# Patient Record
Sex: FEMALE | Race: BLACK OR AFRICAN AMERICAN | Employment: OTHER | URBAN - METROPOLITAN AREA
[De-identification: names, ages, dates, MRNs, and addresses within clinical notes are randomized per-mention and may not be internally consistent; named-entity substitution may affect disease eponyms.]

---

## 2014-01-01 LAB — PAP SMEAR, EXTERNAL: NORMAL

## 2017-02-15 ENCOUNTER — OP HISTORICAL/CONVERTED ENCOUNTER (OUTPATIENT)
Dept: OTHER | Age: 67
End: 2017-02-15

## 2017-04-18 ENCOUNTER — OP HISTORICAL/CONVERTED ENCOUNTER (OUTPATIENT)
Dept: OTHER | Age: 67
End: 2017-04-18

## 2017-08-04 ENCOUNTER — OP HISTORICAL/CONVERTED ENCOUNTER (OUTPATIENT)
Dept: OTHER | Age: 67
End: 2017-08-04

## 2017-08-31 ENCOUNTER — OP HISTORICAL/CONVERTED ENCOUNTER (OUTPATIENT)
Dept: OTHER | Age: 67
End: 2017-08-31

## 2018-02-13 ENCOUNTER — OP HISTORICAL/CONVERTED ENCOUNTER (OUTPATIENT)
Dept: OTHER | Age: 68
End: 2018-02-13

## 2018-05-01 ENCOUNTER — OP HISTORICAL/CONVERTED ENCOUNTER (OUTPATIENT)
Dept: OTHER | Age: 68
End: 2018-05-01

## 2018-05-17 ENCOUNTER — OP HISTORICAL/CONVERTED ENCOUNTER (OUTPATIENT)
Dept: OTHER | Age: 68
End: 2018-05-17

## 2018-06-26 ENCOUNTER — OP HISTORICAL/CONVERTED ENCOUNTER (OUTPATIENT)
Dept: OTHER | Age: 68
End: 2018-06-26

## 2018-10-04 ENCOUNTER — OP HISTORICAL/CONVERTED ENCOUNTER (OUTPATIENT)
Dept: OTHER | Age: 68
End: 2018-10-04

## 2018-12-14 ENCOUNTER — OP HISTORICAL/CONVERTED ENCOUNTER (OUTPATIENT)
Dept: OTHER | Age: 68
End: 2018-12-14

## 2019-03-26 ENCOUNTER — OP HISTORICAL/CONVERTED ENCOUNTER (OUTPATIENT)
Dept: OTHER | Age: 69
End: 2019-03-26

## 2020-01-27 LAB
A/G RATIO, EXTERNAL: 1.3
ALBUMIN, EXTERNAL: 4.1
ALK PHOS, EXTERNAL: 85
ANION GAP BLD CALC-SCNC: NORMAL MMOL/L
BILI DIRECT, EXTERNAL: NORMAL
BILI TOTAL, EXTERNAL: 0.3
BUN BLD-MCNC: 24 MG/DL
BUN/CREATININE RATIO, EXTERNAL: 24
CALCIUM, EXTERNAL: 11.8
CALCIUM, ION, EXTERNAL: NORMAL
CHLORIDE, EXTERNAL: 98
CO2, EXTERNAL: NORMAL
CREATININE SER, EXTERNAL: 0.91
CREATININE, EXTERNAL: 0.91
EGFR IF AFA, EXTERNAL: 74
EGFR NOT AFA, EXTERNAL: 65
GLOBULIN, EXTERNAL: 3.1
GLUCOSE SER, EXTERNAL: 69
HBA1C MFR BLD HPLC: 5.8 %
LDL-C, EXTERNAL: 116
POTASSIUM, EXTERNAL: 4.4
PROTEIN TOT, EXTERNAL: 7.2
SGOT (AST), EXTERNAL: 16
SGPT (ALT), EXTERNAL: NORMAL
SODIUM, EXTERNAL: 140

## 2020-05-08 LAB — HBA1C MFR BLD HPLC: NORMAL %

## 2020-06-25 VITALS
BODY MASS INDEX: 53.6 KG/M2 | SYSTOLIC BLOOD PRESSURE: 140 MMHG | OXYGEN SATURATION: 94 % | HEART RATE: 83 BPM | RESPIRATION RATE: 18 BRPM | HEIGHT: 60 IN | DIASTOLIC BLOOD PRESSURE: 88 MMHG | TEMPERATURE: 97.9 F | WEIGHT: 273 LBS

## 2020-06-25 PROBLEM — E78.5 HYPERLIPIDEMIA: Status: ACTIVE | Noted: 2020-06-25

## 2020-06-25 PROBLEM — E87.70 LOCALIZED EDEMA DUE TO FLUID OVERLOAD: Status: ACTIVE | Noted: 2020-06-25

## 2020-06-25 PROBLEM — M10.9 GOUT: Status: ACTIVE | Noted: 2020-06-25

## 2020-06-25 PROBLEM — I10 ESSENTIAL HYPERTENSION: Status: ACTIVE | Noted: 2020-06-25

## 2020-06-25 PROBLEM — M25.569 KNEE PAIN: Status: ACTIVE | Noted: 2020-06-25

## 2020-06-25 RX ORDER — ALLOPURINOL 100 MG/1
TABLET ORAL DAILY
COMMUNITY
End: 2021-05-15

## 2020-06-25 RX ORDER — FUROSEMIDE 20 MG/1
TABLET ORAL DAILY
COMMUNITY
End: 2021-04-09 | Stop reason: SDUPTHER

## 2020-06-25 RX ORDER — VERAPAMIL HYDROCHLORIDE 240 MG/1
240 CAPSULE, EXTENDED RELEASE ORAL DAILY
COMMUNITY
End: 2020-08-10

## 2020-06-25 RX ORDER — TRAMADOL HYDROCHLORIDE 50 MG/1
50 TABLET ORAL
COMMUNITY
End: 2020-08-14 | Stop reason: SDUPTHER

## 2020-06-25 RX ORDER — COLCHICINE 0.6 MG/1
0.6 TABLET ORAL DAILY
COMMUNITY
End: 2020-08-14 | Stop reason: SDUPTHER

## 2020-06-25 RX ORDER — INDOMETHACIN 50 MG/1
CAPSULE ORAL 3 TIMES DAILY
COMMUNITY
End: 2021-01-05 | Stop reason: ALTCHOICE

## 2020-06-25 RX ORDER — VALSARTAN AND HYDROCHLOROTHIAZIDE 320; 25 MG/1; MG/1
1 TABLET, FILM COATED ORAL DAILY
COMMUNITY
End: 2021-04-23

## 2020-07-30 LAB
ALBUMIN SERPL-MCNC: 3.8 G/DL (ref 3.8–4.8)
ALBUMIN/GLOB SERPL: 1.3 {RATIO} (ref 1.2–2.2)
ALP SERPL-CCNC: 65 IU/L (ref 39–117)
ALT SERPL-CCNC: 11 IU/L (ref 0–32)
AST SERPL-CCNC: 15 IU/L (ref 0–40)
BILIRUB SERPL-MCNC: 0.6 MG/DL (ref 0–1.2)
BUN SERPL-MCNC: 15 MG/DL (ref 8–27)
BUN/CREAT SERPL: 19 (ref 12–28)
CALCIUM SERPL-MCNC: 11.5 MG/DL (ref 8.7–10.3)
CHLORIDE SERPL-SCNC: 100 MMOL/L (ref 96–106)
CHOLEST SERPL-MCNC: 196 MG/DL (ref 100–199)
CO2 SERPL-SCNC: 26 MMOL/L (ref 20–29)
CREAT SERPL-MCNC: 0.79 MG/DL (ref 0.57–1)
ERYTHROCYTE [DISTWIDTH] IN BLOOD BY AUTOMATED COUNT: 14.3 % (ref 11.7–15.4)
GLOBULIN SER CALC-MCNC: 2.9 G/DL (ref 1.5–4.5)
GLUCOSE SERPL-MCNC: 88 MG/DL (ref 65–99)
HCT VFR BLD AUTO: 37.3 % (ref 34–46.6)
HDLC SERPL-MCNC: 62 MG/DL
HGB BLD-MCNC: 12.1 G/DL (ref 11.1–15.9)
LDLC SERPL CALC-MCNC: 120 MG/DL (ref 0–99)
MCH RBC QN AUTO: 28.3 PG (ref 26.6–33)
MCHC RBC AUTO-ENTMCNC: 32.4 G/DL (ref 31.5–35.7)
MCV RBC AUTO: 87 FL (ref 79–97)
PLATELET # BLD AUTO: 236 X10E3/UL (ref 150–450)
POTASSIUM SERPL-SCNC: 4.1 MMOL/L (ref 3.5–5.2)
PROT SERPL-MCNC: 6.7 G/DL (ref 6–8.5)
RBC # BLD AUTO: 4.27 X10E6/UL (ref 3.77–5.28)
SODIUM SERPL-SCNC: 141 MMOL/L (ref 134–144)
TRIGL SERPL-MCNC: 72 MG/DL (ref 0–149)
VLDLC SERPL CALC-MCNC: 14 MG/DL (ref 5–40)
WBC # BLD AUTO: 5.2 X10E3/UL (ref 3.4–10.8)

## 2020-07-31 NOTE — PROGRESS NOTES
LDL ( bad cholesterol) elevated, consider dietary modifications such as more fiber, less fried food, less red meats, no med changes, will re-check at f/u

## 2020-08-03 VITALS
RESPIRATION RATE: 18 BRPM | SYSTOLIC BLOOD PRESSURE: 140 MMHG | DIASTOLIC BLOOD PRESSURE: 80 MMHG | HEART RATE: 80 BPM | OXYGEN SATURATION: 94 % | WEIGHT: 273 LBS | HEIGHT: 60 IN | BODY MASS INDEX: 53.6 KG/M2 | TEMPERATURE: 98 F

## 2020-08-06 NOTE — PROGRESS NOTES
08/06/2020 @ 10:36 AM called and machine answered phone. NO voicemail left; message asked to please call back.

## 2020-08-10 RX ORDER — VERAPAMIL HYDROCHLORIDE 240 MG/1
CAPSULE, EXTENDED RELEASE ORAL
Qty: 90 CAP | Refills: 1 | Status: SHIPPED | OUTPATIENT
Start: 2020-08-10 | End: 2021-04-30 | Stop reason: SDUPTHER

## 2020-08-14 ENCOUNTER — OFFICE VISIT (OUTPATIENT)
Dept: FAMILY MEDICINE CLINIC | Age: 70
End: 2020-08-14
Payer: MEDICARE

## 2020-08-14 VITALS
HEIGHT: 65 IN | DIASTOLIC BLOOD PRESSURE: 84 MMHG | HEART RATE: 86 BPM | RESPIRATION RATE: 18 BRPM | SYSTOLIC BLOOD PRESSURE: 168 MMHG | BODY MASS INDEX: 46.15 KG/M2 | WEIGHT: 277 LBS | OXYGEN SATURATION: 98 %

## 2020-08-14 DIAGNOSIS — M54.9 CHRONIC NECK AND BACK PAIN: ICD-10-CM

## 2020-08-14 DIAGNOSIS — L81.9 DISCOLORATION OF SKIN OF LOWER LEG: ICD-10-CM

## 2020-08-14 DIAGNOSIS — E66.01 OBESITY, MORBID (HCC): ICD-10-CM

## 2020-08-14 DIAGNOSIS — M1A.9XX0 CHRONIC GOUT WITHOUT TOPHUS, UNSPECIFIED CAUSE, UNSPECIFIED SITE: Primary | ICD-10-CM

## 2020-08-14 DIAGNOSIS — M54.2 CHRONIC NECK AND BACK PAIN: ICD-10-CM

## 2020-08-14 DIAGNOSIS — G89.29 CHRONIC NECK AND BACK PAIN: ICD-10-CM

## 2020-08-14 PROCEDURE — 99214 OFFICE O/P EST MOD 30 MIN: CPT | Performed by: NURSE PRACTITIONER

## 2020-08-14 RX ORDER — ASPIRIN 81 MG/1
TABLET ORAL
COMMUNITY

## 2020-08-14 RX ORDER — COLCHICINE 0.6 MG/1
TABLET ORAL
Qty: 14 TAB | Refills: 0 | Status: SHIPPED | OUTPATIENT
Start: 2020-08-14 | End: 2022-07-13

## 2020-08-14 RX ORDER — TRAMADOL HYDROCHLORIDE 50 MG/1
50 TABLET ORAL
Qty: 120 TAB | Refills: 0 | Status: SHIPPED | OUTPATIENT
Start: 2020-08-14 | End: 2020-09-13

## 2020-08-14 NOTE — PROGRESS NOTES
Subjective  Anjelica Finley is a 79 y.o. female. HPI : Usual provider is Dr. Jennifer Calles. Patient is new to me. Has complaints today of pain in knees, ankles and shoulders. This is chronic pain- [facility] but it has been suspended due to the COVID-19 crisis at this time. She is also concerned about discoloration along the sides of both feet. No pain numbness burning tingling. Skin is intact. She has history of edema in legs and feet which is currently stable for her. She takes daily furosemide 20 mg which is helpful for the edema. Review of Systems   Constitutional: Positive for fever. Negative for chills and malaise/fatigue. HENT: Negative for sore throat. Eyes: Negative for blurred vision. Respiratory: Negative for cough, shortness of breath and wheezing. Cardiovascular: Positive for leg swelling. Negative for chest pain and palpitations. Gastrointestinal: Negative for abdominal pain, constipation, diarrhea, heartburn, nausea and vomiting. Genitourinary: Negative for dysuria. Musculoskeletal: Positive for joint pain. Skin: Negative for itching and rash. Neurological: Negative for dizziness, tingling, sensory change and headaches. Psychiatric/Behavioral: Negative for depression. The patient is not nervous/anxious and does not have insomnia. Objective  Physical Exam  Constitutional:       Comments: Morbidly obese   HENT:      Head: Normocephalic. Right Ear: External ear normal.      Left Ear: External ear normal.      Nose: Nose normal.   Eyes:      Conjunctiva/sclera: Conjunctivae normal.   Neck:      Musculoskeletal: Neck supple. Cardiovascular:      Rate and Rhythm: Normal rate and regular rhythm. Pulses: Normal pulses. Heart sounds: Normal heart sounds. No murmur. Pulmonary:      Effort: Pulmonary effort is normal.      Breath sounds: Normal breath sounds.    Musculoskeletal:         General: Swelling present. No tenderness. Right lower leg: Edema present. Left lower leg: Edema present. Comments: Mild edema in feet bilaterally. Skin:     General: Skin is warm and dry. Comments: Skin Is darkened at the lateral aspect on both feet. Neurological:      Mental Status: She is alert and oriented to person, place, and time. Psychiatric:         Mood and Affect: Mood normal.         Behavior: Behavior normal.     Assessment & Plan    ICD-10-CM ICD-9-CM    1. Chronic gout without tophus, unspecified cause, unspecified site  M1A. 9XX0 274.02 colchicine 0.6 mg tablet   2. Discoloration of skin of lower leg  L81.9 709.00 REFERRAL TO VASCULAR SURGERY   3. Obesity, morbid ([facility] [address]  E66.01 278.01    4.  Chronic neck and back pain  M54.2 723.1 traMADoL (ULTRAM) 50 mg tablet    M54.9 724.5     G89.29 338.29      Delonte Daily NP

## 2020-08-22 PROBLEM — M54.40 LUMBAGO WITH SCIATICA: Status: ACTIVE | Noted: 2018-05-01

## 2020-08-22 PROBLEM — R60.9 DEPENDENT EDEMA: Status: ACTIVE | Noted: 2020-08-22

## 2020-08-22 PROBLEM — M79.9 DISORDER OF SOFT TISSUE: Status: ACTIVE | Noted: 2018-05-01

## 2020-08-22 PROBLEM — E78.5 DYSLIPIDEMIA: Status: ACTIVE | Noted: 2017-03-09

## 2020-08-22 PROBLEM — M43.10 SPONDYLOLISTHESIS: Status: ACTIVE | Noted: 2018-09-21

## 2020-08-22 PROBLEM — M1A.00X0 CHRONIC GOUTY ARTHRITIS: Status: ACTIVE | Noted: 2020-08-22

## 2020-08-22 PROBLEM — E83.52 HYPERCALCEMIA: Status: ACTIVE | Noted: 2020-08-22

## 2020-08-22 PROBLEM — M51.26 DISPLACEMENT OF LUMBAR INTERVERTEBRAL DISC WITHOUT MYELOPATHY: Status: ACTIVE | Noted: 2018-09-21

## 2020-08-22 PROBLEM — H54.7 VISUAL IMPAIRMENT: Status: ACTIVE | Noted: 2020-08-22

## 2020-08-22 PROBLEM — G58.8 INTERCOSTAL NEURALGIA: Status: ACTIVE | Noted: 2018-08-07

## 2020-08-22 PROBLEM — R73.02 IMPAIRED GLUCOSE TOLERANCE: Status: ACTIVE | Noted: 2020-08-22

## 2020-08-22 PROBLEM — M79.601 PAIN IN RIGHT ARM: Status: ACTIVE | Noted: 2018-05-01

## 2020-10-19 ENCOUNTER — TELEPHONE (OUTPATIENT)
Dept: FAMILY MEDICINE CLINIC | Age: 70
End: 2020-10-19

## 2020-10-19 DIAGNOSIS — M54.2 CHRONIC NECK AND BACK PAIN: Primary | ICD-10-CM

## 2020-10-19 DIAGNOSIS — M54.9 CHRONIC NECK AND BACK PAIN: Primary | ICD-10-CM

## 2020-10-19 DIAGNOSIS — G89.29 CHRONIC NECK AND BACK PAIN: Primary | ICD-10-CM

## 2020-10-20 RX ORDER — TRAMADOL HYDROCHLORIDE 50 MG/1
50 TABLET ORAL
Qty: 90 TAB | Refills: 0 | Status: CANCELLED | OUTPATIENT
Start: 2020-10-20 | End: 2020-11-19

## 2020-10-20 NOTE — TELEPHONE ENCOUNTER
Patient called checking on the status of her Tramadol refill. She stated she has not had a script for a little while but is hurting pretty bad and needs a refill.

## 2020-10-21 NOTE — TELEPHONE ENCOUNTER
Patient called checking on the status of her Tramadol refill. She stated she has not had a script for a little while but is hurting pretty bad and needs a refill. Dahlia left another message today stating she is still in pain and the pharmacy still does not have her medication. She is requesting her medication be sent in please.

## 2020-10-22 DIAGNOSIS — M54.9 CHRONIC NECK AND BACK PAIN: Primary | ICD-10-CM

## 2020-10-22 DIAGNOSIS — M54.2 CHRONIC NECK AND BACK PAIN: Primary | ICD-10-CM

## 2020-10-22 DIAGNOSIS — G89.29 CHRONIC NECK AND BACK PAIN: Primary | ICD-10-CM

## 2020-10-22 RX ORDER — TRAMADOL HYDROCHLORIDE 50 MG/1
50 TABLET ORAL
Qty: 12 TAB | Refills: 5 | Status: SHIPPED | OUTPATIENT
Start: 2020-10-22 | End: 2020-10-23 | Stop reason: SDUPTHER

## 2020-10-23 ENCOUNTER — TELEPHONE (OUTPATIENT)
Dept: FAMILY MEDICINE CLINIC | Age: 70
End: 2020-10-23

## 2020-10-23 DIAGNOSIS — M54.9 CHRONIC NECK AND BACK PAIN: ICD-10-CM

## 2020-10-23 DIAGNOSIS — G89.29 CHRONIC NECK AND BACK PAIN: ICD-10-CM

## 2020-10-23 DIAGNOSIS — M54.2 CHRONIC NECK AND BACK PAIN: ICD-10-CM

## 2020-10-23 RX ORDER — TRAMADOL HYDROCHLORIDE 50 MG/1
50 TABLET ORAL
Qty: 120 TAB | Refills: 5 | Status: SHIPPED | OUTPATIENT
Start: 2020-10-23 | End: 2020-10-26

## 2020-10-23 NOTE — PROGRESS NOTES
I have reviewed the note dated 8- and agree that I was available for discussion in person, telephonically or virtually. I will follow along with CLAIRE Dela Cruz.     Juan Class, DO

## 2020-10-23 NOTE — TELEPHONE ENCOUNTER
Patient called and she only got 12 pills of the Tramadol. She needs a script to get her through the month. She is requessting a phone call from the provider. She needs something else because 12 pills will not get her through the month. Please call patient on her cell phone.

## 2020-10-26 NOTE — TELEPHONE ENCOUNTER
Patient called back and is in a lot of pain. She normally gets 120 tabs but was only given 12 for the month. Please advise and call patient when resolved.

## 2020-10-28 DIAGNOSIS — M54.9 BACK PAIN, UNSPECIFIED BACK LOCATION, UNSPECIFIED BACK PAIN LATERALITY, UNSPECIFIED CHRONICITY: Primary | ICD-10-CM

## 2020-10-28 RX ORDER — TRAMADOL HYDROCHLORIDE 50 MG/1
50 TABLET ORAL
Qty: 120 TAB | Refills: 5 | Status: SHIPPED | OUTPATIENT
Start: 2020-10-28 | End: 2020-11-27

## 2020-12-15 ENCOUNTER — TELEPHONE (OUTPATIENT)
Dept: FAMILY MEDICINE CLINIC | Age: 70
End: 2020-12-15

## 2020-12-15 ENCOUNTER — OFFICE VISIT (OUTPATIENT)
Dept: FAMILY MEDICINE CLINIC | Age: 70
End: 2020-12-15
Payer: MEDICARE

## 2020-12-15 VITALS
DIASTOLIC BLOOD PRESSURE: 84 MMHG | HEART RATE: 77 BPM | WEIGHT: 282 LBS | RESPIRATION RATE: 18 BRPM | BODY MASS INDEX: 46.93 KG/M2 | SYSTOLIC BLOOD PRESSURE: 146 MMHG | TEMPERATURE: 98.1 F | OXYGEN SATURATION: 96 %

## 2020-12-15 DIAGNOSIS — R60.0 LOCALIZED EDEMA: ICD-10-CM

## 2020-12-15 DIAGNOSIS — M54.41 CHRONIC BILATERAL LOW BACK PAIN WITH BILATERAL SCIATICA: ICD-10-CM

## 2020-12-15 DIAGNOSIS — M79.605 LEFT LEG PAIN: Primary | ICD-10-CM

## 2020-12-15 DIAGNOSIS — R73.02 IMPAIRED GLUCOSE TOLERANCE: ICD-10-CM

## 2020-12-15 DIAGNOSIS — M54.42 CHRONIC BILATERAL LOW BACK PAIN WITH BILATERAL SCIATICA: ICD-10-CM

## 2020-12-15 DIAGNOSIS — G89.29 CHRONIC BILATERAL LOW BACK PAIN WITH BILATERAL SCIATICA: ICD-10-CM

## 2020-12-15 DIAGNOSIS — I10 HYPERTENSION, ESSENTIAL: ICD-10-CM

## 2020-12-15 DIAGNOSIS — E78.5 HYPERLIPIDEMIA, UNSPECIFIED HYPERLIPIDEMIA TYPE: ICD-10-CM

## 2020-12-15 PROCEDURE — 99214 OFFICE O/P EST MOD 30 MIN: CPT | Performed by: NURSE PRACTITIONER

## 2020-12-15 NOTE — TELEPHONE ENCOUNTER
Via Yasemin Whyte from Dr. John Paul Owens office would like the last progress notes from our office faxed over to [phone]. Patient was send in office today 12/15 so after the notes are done, can you please fax notes over to Dr. John Paul Owens.

## 2020-12-18 NOTE — PROGRESS NOTES
Subjective:     Lona Rosales is a 79 y.o. female who presents for follow up of hypertension, hyperlipidemia, coronary artery disease, obesity and swelling of the legs. Diet and Lifestyle: generally follows a low fat low cholesterol diet, not attempting to follow a low sodium diet, exercises regularly, nonsmoker  Home BP Monitoring: is not measured at home    Cardiovascular ROS: taking medications as instructed, no medication side effects noted, no TIA's, no chest pain on exertion, no dyspnea on exertion, noting swelling of ankles. New concerns: New onset of leg swelling more prominent in on the left ankle and feet. Does not experience any weakness or tingling per se but does increase swelling with prolonged standing. Has tried compression stockings with minimal improvement. .     Patient Active Problem List   Diagnosis Code    Localized edema due to fluid overload E87.70    Essential hypertension I10    Gout M10.9    Hyperlipidemia E78.5    Knee pain M25.569    Chronic neck and back pain M54.2, M54.9, G89.29    Morbid obesity ([facility] E66.01    Chronic gouty arthritis M1A. 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    Impaired glucose tolerance R73.02    Lumbago with sciatica M54.40    Pain in right arm M79.601    Spondylolisthesis M43.10    Visual impairment H54.7     Patient Active Problem List    Diagnosis Date Noted    Chronic gouty arthritis 08/22/2020    Dependent edema 08/22/2020    Hypercalcemia 08/22/2020    Impaired glucose tolerance 08/22/2020    Visual impairment 08/22/2020    Morbid obesity ([facility] [address] 08/14/2020    Localized edema due to fluid overload 06/25/2020    Essential hypertension 06/25/2020    Gout 06/25/2020    Hyperlipidemia 06/25/2020    Knee pain 06/25/2020    Chronic neck and back pain     Displacement of lumbar intervertebral disc without myelopathy 2018    Spondylolisthesis 2018    Intercostal neuralgia 2018    Disorder of soft tissue 2018    Lumbago with sciatica 2018    Pain in right arm 2018    Dyslipidemia 2017     Current Outpatient Medications   Medication Sig Dispense Refill    colchicine 0.6 mg tablet Take 2 tabs by mouth x 1 dose and can repeat 1 tablet 1 hour later for gout flare. Then take 1 tab daily thereafter until pain gone. Do not take allopurinol while using the colchicine. 14 Tab 0    aspirin delayed-release 81 mg tablet aspirin 81 mg tablet,delayed release   TAKE 1 TABLET BY MOUTH DAILY WITH A MEAL      verapamil ER (VERELAN) 240 mg ER capsule TAKE 1 CAPSULE BY MOUTH EVERY DAY 90 Cap 1    allopurinoL (ZYLOPRIM) 100 mg tablet Take  by mouth daily.  furosemide (LASIX) 20 mg tablet Take  by mouth daily.  indomethacin (INDOCIN) 50 mg capsule Take  by mouth three (3) times daily.  valsartan-hydroCHLOROthiazide (DIOVAN-HCT) 320-25 mg per tablet Take 1 Tab by mouth daily.        Allergies   Allergen Reactions    Gabapentin Other (comments)     Past Medical History:   Diagnosis Date    Chronic neck and back pain     Essential hypertension 2020    Gout 2020    Hyperlipidemia 2020    Knee pain 2020    Localized edema due to fluid overload 2020     Past Surgical History:   Procedure Laterality Date    HX GYN          HX GYN           HX ORTHOPAEDIC Left     complete rotator cuff repair x 2     HX ORTHOPAEDIC Right     total knee replacement     Family History   Problem Relation Age of Onset    No Known Problems Mother     No Known Problems Father      Social History     Tobacco Use    Smoking status: Never Smoker    Smokeless tobacco: Never Used   Substance Use Topics    Alcohol use: Not Currently     Frequency: Never        Lab Results   Component Value Date/Time WBC 5.2 07/28/2020 12:16 PM    HGB 12.1 07/28/2020 12:16 PM    HCT 37.3 07/28/2020 12:16 PM    PLATELET 032 94/30/6842 12:16 PM    MCV 87 07/28/2020 12:16 PM     Lab Results   Component Value Date/Time    Hemoglobin A1c, External 1,272,020 05/08/2020    Hemoglobin A1c, External 5.8 01/27/2020    Glucose 88 07/28/2020 12:16 PM    LDL, calculated 120 (H) 07/28/2020 12:16 PM    Creatinine 0.79 07/28/2020 12:16 PM      Lab Results   Component Value Date/Time    Cholesterol, total 196 07/28/2020 12:16 PM    HDL Cholesterol 62 07/28/2020 12:16 PM    LDL, calculated 120 (H) 07/28/2020 12:16 PM    LDL-C, External 116 01/27/2020    Triglyceride 72 07/28/2020 12:16 PM     No results found for: TSH, TSH2, TSH3, TSHP, TSHELE, TSHEXT, TT3, T3U, T3UP, FRT3, FT3, FT4, FT4P, T4, T4P, FT4T, TT7, TSHEXT      Review of Systems, additional:  A comprehensive review of systems was negative except for that written in the HPI. Objective:     Visit Vitals  BP (!) 146/84 (BP 1 Location: Right arm, BP Patient Position: Sitting)   Pulse 77   Temp 98.1 °F (36.7 °C) (Oral)   Resp 18   Wt 282 lb (127.9 kg)   SpO2 96%   BMI 46.93 kg/m²     Appearance: alert, well appearing, and in no distress, oriented to person, place, and time, overweight and acyanotic, in no respiratory distress.   General exam: CVS exam BP noted to be well controlled today in office, S1, S2 normal, no gallop, no murmur, chest clear, no JVD, no HSM, no edema, diabetic exam heart sounds normal rate, regular rhythm, normal S1, S2, no murmurs, rubs, clicks or gallops, chest clear, no hepatosplenomegaly, no carotid bruits, peripheral vascular exam varicose veins noted, color, temperature, sensation in feet normal, no lesions, neurological exam alert, oriented, normal speech, no focal findings or movement disorder noted, screening mental status exam normal, neck supple without rigidity, motor and sensory grossly normal bilaterally, normal muscle tone, no tremors, strength 5/5.  Lab review: labs are reviewed, up to date and normal.     Assessment/Plan: This point has been referred to vascular surgeon for further evaluation of the swelling and pain of the lower extremity with suspicion for vascular issues    Otherwise patient stable we will not make any other changes to current regimen    Follow-up in 3 months for reevaluation or sooner as needed    ICD-10-CM ICD-9-CM    1. Left leg pain  M79.605 729.5 REFERRAL TO VASCULAR SURGERY   2. Hyperlipidemia, unspecified hyperlipidemia type  E78.5 272.4 LIPID PANEL   3. Hypertension, essential  I10 401.9 CBC WITH AUTOMATED DIFF      METABOLIC PANEL, COMPREHENSIVE      THYROID CASCADE PROFILE      MICROALBUMIN, UR, RAND W/ MICROALB/CREAT RATIO   4. Impaired glucose tolerance  R73.02 790.22 HEMOGLOBIN A1C WITH EAG   5. Chronic bilateral low back pain with bilateral sciatica  M54.42 724.2     M54.41 724.3     G89.29 338.29    6.  Localized edema  R60.0 782.3

## 2020-12-28 ENCOUNTER — TELEPHONE (OUTPATIENT)
Dept: FAMILY MEDICINE CLINIC | Age: 70
End: 2020-12-28

## 2020-12-28 NOTE — TELEPHONE ENCOUNTER
Refill request for Indomethacin 50 mg. Take 1 capsule by mouth twice daily as  needed     NOT COVERED - ALTERNATIVES:  Ibuprofen tab,, ketoprofen cap, etodolac cap     Please send alternative.

## 2020-12-31 ENCOUNTER — OFFICE VISIT (OUTPATIENT)
Dept: SURGERY | Age: 70
End: 2020-12-31
Payer: MEDICARE

## 2020-12-31 VITALS
SYSTOLIC BLOOD PRESSURE: 146 MMHG | HEIGHT: 65 IN | DIASTOLIC BLOOD PRESSURE: 76 MMHG | TEMPERATURE: 96.9 F | OXYGEN SATURATION: 96 % | BODY MASS INDEX: 45.55 KG/M2 | WEIGHT: 273.4 LBS | HEART RATE: 68 BPM

## 2020-12-31 DIAGNOSIS — R60.9 DEPENDENT EDEMA: ICD-10-CM

## 2020-12-31 DIAGNOSIS — M79.89 LEG SWELLING: Primary | ICD-10-CM

## 2020-12-31 DIAGNOSIS — I89.0 LYMPHEDEMA: ICD-10-CM

## 2020-12-31 DIAGNOSIS — E87.70 LOCALIZED EDEMA DUE TO FLUID OVERLOAD: ICD-10-CM

## 2020-12-31 PROCEDURE — 3017F COLORECTAL CA SCREEN DOC REV: CPT | Performed by: SURGERY

## 2020-12-31 PROCEDURE — G8427 DOCREV CUR MEDS BY ELIG CLIN: HCPCS | Performed by: SURGERY

## 2020-12-31 PROCEDURE — 1101F PT FALLS ASSESS-DOCD LE1/YR: CPT | Performed by: SURGERY

## 2020-12-31 PROCEDURE — 1090F PRES/ABSN URINE INCON ASSESS: CPT | Performed by: SURGERY

## 2020-12-31 PROCEDURE — G8536 NO DOC ELDER MAL SCRN: HCPCS | Performed by: SURGERY

## 2020-12-31 PROCEDURE — G8400 PT W/DXA NO RESULTS DOC: HCPCS | Performed by: SURGERY

## 2020-12-31 PROCEDURE — G8432 DEP SCR NOT DOC, RNG: HCPCS | Performed by: SURGERY

## 2020-12-31 PROCEDURE — 99213 OFFICE O/P EST LOW 20 MIN: CPT | Performed by: SURGERY

## 2020-12-31 PROCEDURE — G8417 CALC BMI ABV UP PARAM F/U: HCPCS | Performed by: SURGERY

## 2020-12-31 PROCEDURE — G8753 SYS BP > OR = 140: HCPCS | Performed by: SURGERY

## 2020-12-31 PROCEDURE — G8754 DIAS BP LESS 90: HCPCS | Performed by: SURGERY

## 2020-12-31 RX ORDER — TRAMADOL HYDROCHLORIDE 50 MG/1
TABLET ORAL
COMMUNITY
Start: 2020-12-28 | End: 2021-04-09 | Stop reason: SDUPTHER

## 2020-12-31 RX ORDER — METHYLPREDNISOLONE 4 MG/1
TABLET ORAL
COMMUNITY
Start: 2020-12-24 | End: 2021-04-09 | Stop reason: SDUPTHER

## 2021-01-01 PROBLEM — I89.0 LYMPHEDEMA: Status: ACTIVE | Noted: 2021-01-01

## 2021-01-01 NOTE — PROGRESS NOTES
VASCULAR FOLLOW UP      Subjective:   CHIEF COMPLAINTS:  Worsening swelling on the left leg. PRESENTATION OF ILLNESS:    Ms. Camden Albarran is here today follow-up. She is accompanied with her family. She is complaining of recent month more swelling on the left calf area. Patient was able to get compression stocking since last visit however currently stocking is not able to fit. Patient apparently had his stocking custom-made 6-month ago. Patient is also complaining of recent month more swelling on the calf area on the left side. Patient denies any chest pain shortness of breath. Patient did not have any recent duplex venous ultrasound. Patient suffers from combination of both chronic venous hypertension and lymphedema. Patient denies a personal history of deep vein thrombosis. Past Medical History:   Diagnosis Date    Chronic neck and back pain     Essential hypertension 2020    Gout 2020    Hyperlipidemia 2020    Knee pain 2020    Localized edema due to fluid overload 2020      Past Surgical History:   Procedure Laterality Date    HX GYN          HX GYN           HX ORTHOPAEDIC Left     complete rotator cuff repair x 2     HX ORTHOPAEDIC Right     total knee replacement     Family History   Problem Relation Age of Onset    No Known Problems Mother     No Known Problems Father       Social History     Tobacco Use    Smoking status: Never Smoker    Smokeless tobacco: Never Used   Substance Use Topics    Alcohol use: Not Currently     Frequency: Never       Prior to Admission medications    Medication Sig Start Date End Date Taking? Authorizing Provider   methylPREDNISolone (MEDROL DOSEPACK) 4 mg tablet TAKE AS DIRECTED 20  Yes Provider, Historical   traMADoL (ULTRAM) 50 mg tablet  20  Yes Provider, Historical   colchicine 0.6 mg tablet Take 2 tabs by mouth x 1 dose and can repeat 1 tablet 1 hour later for gout flare.  Then take 1 tab daily thereafter until pain gone. Do not take allopurinol while using the colchicine. 8/14/20  Yes Cherie Arellano NP   aspirin delayed-release 81 mg tablet aspirin 81 mg tablet,delayed release   TAKE 1 TABLET BY MOUTH DAILY WITH A MEAL   Yes Provider, Historical   verapamil ER (VERELAN) 240 mg ER capsule TAKE 1 CAPSULE BY MOUTH EVERY DAY 8/10/20  Yes Jenna Gutierrez, NP   allopurinoL (ZYLOPRIM) 100 mg tablet Take  by mouth daily. Yes Provider, Historical   furosemide (LASIX) 20 mg tablet Take  by mouth daily. Yes Provider, Historical   indomethacin (INDOCIN) 50 mg capsule Take  by mouth three (3) times daily. Yes Provider, Historical   valsartan-hydroCHLOROthiazide (DIOVAN-HCT) 320-25 mg per tablet Take 1 Tab by mouth daily. Yes Provider, Historical     Allergies   Allergen Reactions    Gabapentin Other (comments)        Review of Systems:  I reviewed the rest of organ systems personally and they were negative signed by Dr. Lina Blackburn    Objective:     Visit Vitals  BP (!) 146/76 (BP 1 Location: Right arm, BP Patient Position: Sitting)   Pulse 68   Temp 96.9 °F (36.1 °C) (Temporal)   Ht 5' 5.25\" (1.657 m)   Wt 273 lb 6.4 oz (124 kg)   SpO2 96%   BMI 45.15 kg/m²     VITAL SIGNS REVIEWED. Physical Exam:  Patient is well-nourished pleasant in conversation is appropriate. Head and neck examination atraumatic, normocephalic. Gaze appropriate. Conversation appropriate. Neck examination shows supple. No mass. No obvious carotid bruit. Chest examination shows lungs are clear bilaterally well-expanded, no crackles or wheezes. Cardiovascular system regular rate, no obvious murmur. Skin warm to touch  and moist, no skin lesions. Abdomen is soft ,not tender or distended bowel sounds present. No palpable mass. Neurological examinations, no focal neuro deficits moving all 4 extremities. Cranial nerves intact. Sensation is intact as well.   Hematologic: No obvious bruise or swelling or obvious lymphadenopathy. Psychosocial: Appropriate. Has good effect. Musculoskeletal system: No muscle wasting, appropriate movements upper and lower extremity. Vascular examination: Venous Doppler interrogation shows mild to moderate reflux noted around left ankle. Patient does have pitting edema at the bilateral pedal and the calf area. Patient has a palpable arterial pulse both dorsalis pedis. Data Review:   No visits with results within 1 Month(s) from this visit. Latest known visit with results is:   Abstract on 08/03/2020   Component Date Value Ref Range Status    LDL-C, External 01/27/2020 116   Final    Creatinine, External 01/27/2020 0.91   Final    Hemoglobin A1c, External 01/27/2020 5.8   Final        Assessment:     Problem List Items Addressed This Visit        Other    Localized edema due to fluid overload    Relevant Medications    methylPREDNISolone (MEDROL DOSEPACK) 4 mg tablet    Dependent edema    Relevant Medications    methylPREDNISolone (MEDROL DOSEPACK) 4 mg tablet    Lymphedema    Relevant Medications    methylPREDNISolone (MEDROL DOSEPACK) 4 mg tablet      Other Visit Diagnoses     Leg swelling    -  Primary    Relevant Orders    DUPLEX LOWER EXT VENOUS BILAT              Plan:     Most likely patient's worsening condition is noncompliance with compression stocking. So she will need the custom-made stocking remade with a pressure system of 20 to 30 mmHg. I will send her to the lymphedema clinic and [facility] in [facility]. Patient will also need a surveillance venous duplex examination to investigate deep vein thrombosis as well. Patient will revisit me in 3-month follow-up and I will follow up venous duplex ultrasound results as well.         Grzegorz Conner MD

## 2021-01-05 DIAGNOSIS — M1A.9XX0 CHRONIC GOUT WITHOUT TOPHUS, UNSPECIFIED CAUSE, UNSPECIFIED SITE: Primary | ICD-10-CM

## 2021-01-05 RX ORDER — ETODOLAC 400 MG/1
400 TABLET, FILM COATED ORAL 2 TIMES DAILY WITH MEALS
Qty: 180 TAB | Refills: 1 | Status: SHIPPED | OUTPATIENT
Start: 2021-01-05 | End: 2022-07-13

## 2021-01-07 ENCOUNTER — HOSPITAL ENCOUNTER (OUTPATIENT)
Dept: VASCULAR SURGERY | Age: 71
Discharge: HOME | End: 2021-01-07
Payer: MEDICARE

## 2021-01-07 DIAGNOSIS — M79.89 LEG SWELLING: ICD-10-CM

## 2021-01-07 PROCEDURE — 93970 EXTREMITY STUDY: CPT

## 2021-02-12 LAB
ALBUMIN SERPL-MCNC: 3.8 G/DL (ref 3.8–4.8)
ALBUMIN/CREAT UR: 13 MG/G CREAT (ref 0–29)
ALBUMIN/GLOB SERPL: 1.2 {RATIO} (ref 1.2–2.2)
ALP SERPL-CCNC: 78 IU/L (ref 39–117)
ALT SERPL-CCNC: 12 IU/L (ref 0–32)
AST SERPL-CCNC: 17 IU/L (ref 0–40)
BASOPHILS # BLD AUTO: 0.1 X10E3/UL (ref 0–0.2)
BASOPHILS NFR BLD AUTO: 1 %
BILIRUB SERPL-MCNC: 0.5 MG/DL (ref 0–1.2)
BUN SERPL-MCNC: 20 MG/DL (ref 8–27)
BUN/CREAT SERPL: 25 (ref 12–28)
CALCIUM SERPL-MCNC: 11.8 MG/DL (ref 8.7–10.3)
CHLORIDE SERPL-SCNC: 100 MMOL/L (ref 96–106)
CHOLEST SERPL-MCNC: 177 MG/DL (ref 100–199)
CO2 SERPL-SCNC: 30 MMOL/L (ref 20–29)
CREAT SERPL-MCNC: 0.81 MG/DL (ref 0.57–1)
CREAT UR-MCNC: 94.7 MG/DL
EOSINOPHIL # BLD AUTO: 0.3 X10E3/UL (ref 0–0.4)
EOSINOPHIL NFR BLD AUTO: 4 %
ERYTHROCYTE [DISTWIDTH] IN BLOOD BY AUTOMATED COUNT: 14.5 % (ref 11.7–15.4)
EST. AVERAGE GLUCOSE BLD GHB EST-MCNC: 128 MG/DL
GLOBULIN SER CALC-MCNC: 3.1 G/DL (ref 1.5–4.5)
GLUCOSE SERPL-MCNC: 79 MG/DL (ref 65–99)
HBA1C MFR BLD: 6.1 % (ref 4.8–5.6)
HCT VFR BLD AUTO: 37.9 % (ref 34–46.6)
HDLC SERPL-MCNC: 64 MG/DL
HGB BLD-MCNC: 12.7 G/DL (ref 11.1–15.9)
IMM GRANULOCYTES # BLD AUTO: 0 X10E3/UL (ref 0–0.1)
IMM GRANULOCYTES NFR BLD AUTO: 0 %
LDLC SERPL CALC-MCNC: 103 MG/DL (ref 0–99)
LYMPHOCYTES # BLD AUTO: 2.5 X10E3/UL (ref 0.7–3.1)
LYMPHOCYTES NFR BLD AUTO: 41 %
MCH RBC QN AUTO: 29.5 PG (ref 26.6–33)
MCHC RBC AUTO-ENTMCNC: 33.5 G/DL (ref 31.5–35.7)
MCV RBC AUTO: 88 FL (ref 79–97)
MICROALBUMIN UR-MCNC: 12.2 UG/ML
MONOCYTES # BLD AUTO: 0.7 X10E3/UL (ref 0.1–0.9)
MONOCYTES NFR BLD AUTO: 11 %
NEUTROPHILS # BLD AUTO: 2.6 X10E3/UL (ref 1.4–7)
NEUTROPHILS NFR BLD AUTO: 43 %
PLATELET # BLD AUTO: 238 X10E3/UL (ref 150–450)
POTASSIUM SERPL-SCNC: 4.3 MMOL/L (ref 3.5–5.2)
PROT SERPL-MCNC: 6.9 G/DL (ref 6–8.5)
RBC # BLD AUTO: 4.3 X10E6/UL (ref 3.77–5.28)
SODIUM SERPL-SCNC: 139 MMOL/L (ref 134–144)
TRIGL SERPL-MCNC: 53 MG/DL (ref 0–149)
TSH SERPL DL<=0.005 MIU/L-ACNC: 3.23 UIU/ML (ref 0.45–4.5)
VLDLC SERPL CALC-MCNC: 10 MG/DL (ref 5–40)
WBC # BLD AUTO: 6.1 X10E3/UL (ref 3.4–10.8)

## 2021-02-16 ENCOUNTER — OFFICE VISIT (OUTPATIENT)
Dept: FAMILY MEDICINE CLINIC | Age: 71
End: 2021-02-16
Payer: MEDICARE

## 2021-02-16 ENCOUNTER — IMMUNIZATION (OUTPATIENT)
Dept: FAMILY MEDICINE CLINIC | Age: 71
End: 2021-02-16

## 2021-02-16 VITALS
HEART RATE: 78 BPM | WEIGHT: 278 LBS | SYSTOLIC BLOOD PRESSURE: 140 MMHG | HEIGHT: 60 IN | OXYGEN SATURATION: 95 % | BODY MASS INDEX: 54.58 KG/M2 | TEMPERATURE: 97.8 F | DIASTOLIC BLOOD PRESSURE: 79 MMHG | RESPIRATION RATE: 16 BRPM

## 2021-02-16 DIAGNOSIS — I10 ESSENTIAL HYPERTENSION: ICD-10-CM

## 2021-02-16 DIAGNOSIS — M79.605 LEFT LEG PAIN: ICD-10-CM

## 2021-02-16 DIAGNOSIS — E78.5 HYPERLIPIDEMIA, UNSPECIFIED HYPERLIPIDEMIA TYPE: ICD-10-CM

## 2021-02-16 DIAGNOSIS — L81.9 DISCOLORATION OF SKIN OF LOWER LEG: ICD-10-CM

## 2021-02-16 DIAGNOSIS — Z23 ENCOUNTER FOR IMMUNIZATION: Primary | ICD-10-CM

## 2021-02-16 DIAGNOSIS — R60.9 EDEMA, UNSPECIFIED TYPE: Primary | ICD-10-CM

## 2021-02-16 PROCEDURE — 0011A COVID-19, MRNA, LNP-S, PF, 100MCG/0.5ML DOSE(MODERNA): CPT | Performed by: FAMILY MEDICINE

## 2021-02-16 PROCEDURE — 1090F PRES/ABSN URINE INCON ASSESS: CPT | Performed by: NURSE PRACTITIONER

## 2021-02-16 PROCEDURE — G8417 CALC BMI ABV UP PARAM F/U: HCPCS | Performed by: NURSE PRACTITIONER

## 2021-02-16 PROCEDURE — 91301 COVID-19, MRNA, LNP-S, PF, 100MCG/0.5ML DOSE(MODERNA): CPT | Performed by: FAMILY MEDICINE

## 2021-02-16 PROCEDURE — G8432 DEP SCR NOT DOC, RNG: HCPCS | Performed by: NURSE PRACTITIONER

## 2021-02-16 PROCEDURE — 99214 OFFICE O/P EST MOD 30 MIN: CPT | Performed by: NURSE PRACTITIONER

## 2021-02-16 PROCEDURE — G8753 SYS BP > OR = 140: HCPCS | Performed by: NURSE PRACTITIONER

## 2021-02-16 PROCEDURE — G8536 NO DOC ELDER MAL SCRN: HCPCS | Performed by: NURSE PRACTITIONER

## 2021-02-16 PROCEDURE — G8399 PT W/DXA RESULTS DOCUMENT: HCPCS | Performed by: NURSE PRACTITIONER

## 2021-02-16 PROCEDURE — 1101F PT FALLS ASSESS-DOCD LE1/YR: CPT | Performed by: NURSE PRACTITIONER

## 2021-02-16 PROCEDURE — 3017F COLORECTAL CA SCREEN DOC REV: CPT | Performed by: NURSE PRACTITIONER

## 2021-02-16 PROCEDURE — G8754 DIAS BP LESS 90: HCPCS | Performed by: NURSE PRACTITIONER

## 2021-02-16 PROCEDURE — G8427 DOCREV CUR MEDS BY ELIG CLIN: HCPCS | Performed by: NURSE PRACTITIONER

## 2021-02-16 RX ORDER — INDOMETHACIN 50 MG/1
CAPSULE ORAL
COMMUNITY
Start: 2021-02-15 | End: 2021-05-29

## 2021-02-16 NOTE — PROGRESS NOTES
Rosi Delgado (: 1950) is a 79 y.o. female, established patient, here for evaluation of the following chief complaint(s):  Follow-up, Leg Pain, Hypertension, Cholesterol Problem, Peripheral Edema, and Medication Refill       ASSESSMENT/PLAN:  1. Edema, unspecified type  -     REFERRAL TO VASCULAR SURGERY    2. Left leg pain  -     REFERRAL TO VASCULAR SURGERY    3. Discoloration of skin of lower leg  -     REFERRAL TO VASCULAR SURGERY    4. Essential hypertension    5. Hyperlipidemia, unspecified hyperlipidemia type        Return in about 4 weeks (around 3/16/2021) for post vascular, edema, . SUBJECTIVE/OBJECTIVE:  HPI  Patient presenting for hypertension followup, medication refill, hyperlipidemia check. Follow-up (Post vacular L leg discoloration, htn chol edema), Leg Pain (Post vacular L leg discoloration), reports that went to vascular specialist and was prescribed compression stockings which have helped with edema but persistent. Reports of not being able to go to do water exercises due to continues swelling of the legs. Reports swelling worse after prolonged standing improves slightly with elevation but is worse when not wearing compression stockings. Reports have been trying dietary changes to include less sodium in her diet. Medication Refill (Needs another refill of her steriod pack. She has used her last one.  ) Patient reports blood pressures at home most frequently normal. Patient has symptoms of none of the following; no chest pain, shortness of breath, weakness, orthostatic hypotension, cough, myalgias, rash, headaches, weight gain, leg swelling, palpitations, slow heart rate, fatigue, depression. Patient reports good compliance with medications, no side effects from medications noted. Current treatments include diet modification, weight loss.        Review of Systems  All other systems reviewed and are negative, vital signs reviewed  Visit Vitals  BP (!) 140/79 (BP 1 Location: Left upper arm, BP Patient Position: Sitting, BP Cuff Size: Adult long)   Pulse 78   Temp 97.8 °F (36.6 °C) (Temporal)   Resp 16   Ht 5' 0.25\" (1.53 m)   Wt 278 lb (126.1 kg)   SpO2 95%   BMI 53.84 kg/m²       Physical Exam  Constitutional:  No acute distress  HEENT:  Head normocephalic and atraumatic. CV:  Regular rate and rhythm. No murmur. Respiratory:  Lungs clear to auscultation bilaterally  Abdomen:  Soft, non-tender. Skin:  Normal color. Warm and Dry  Extremities:  Non-tender. No pedal edema. Back:  No tenderness  Neuro:  No gross motor deficits      An electronic signature was used to authenticate this note.   -- Arben Hudson, NP

## 2021-03-16 ENCOUNTER — IMMUNIZATION (OUTPATIENT)
Dept: FAMILY MEDICINE CLINIC | Age: 71
End: 2021-03-16
Payer: MEDICARE

## 2021-03-16 DIAGNOSIS — Z23 ENCOUNTER FOR IMMUNIZATION: Primary | ICD-10-CM

## 2021-03-16 PROCEDURE — 0012A COVID-19, MRNA, LNP-S, PF, 100MCG/0.5ML DOSE(MODERNA): CPT | Performed by: FAMILY MEDICINE

## 2021-03-16 PROCEDURE — 91301 COVID-19, MRNA, LNP-S, PF, 100MCG/0.5ML DOSE(MODERNA): CPT | Performed by: FAMILY MEDICINE

## 2021-04-09 ENCOUNTER — OFFICE VISIT (OUTPATIENT)
Dept: FAMILY MEDICINE CLINIC | Age: 71
End: 2021-04-09
Payer: MEDICARE

## 2021-04-09 VITALS
OXYGEN SATURATION: 97 % | HEIGHT: 60 IN | WEIGHT: 287 LBS | RESPIRATION RATE: 18 BRPM | DIASTOLIC BLOOD PRESSURE: 87 MMHG | SYSTOLIC BLOOD PRESSURE: 141 MMHG | TEMPERATURE: 97.7 F | BODY MASS INDEX: 56.35 KG/M2 | HEART RATE: 71 BPM

## 2021-04-09 DIAGNOSIS — M79.605 PAIN IN BOTH LOWER EXTREMITIES: ICD-10-CM

## 2021-04-09 DIAGNOSIS — M54.41 CHRONIC BILATERAL LOW BACK PAIN WITH BILATERAL SCIATICA: ICD-10-CM

## 2021-04-09 DIAGNOSIS — I10 ESSENTIAL HYPERTENSION: ICD-10-CM

## 2021-04-09 DIAGNOSIS — M79.604 PAIN IN BOTH LOWER EXTREMITIES: ICD-10-CM

## 2021-04-09 DIAGNOSIS — G89.29 CHRONIC BILATERAL LOW BACK PAIN WITH BILATERAL SCIATICA: ICD-10-CM

## 2021-04-09 DIAGNOSIS — R60.9 EDEMA, UNSPECIFIED TYPE: Primary | ICD-10-CM

## 2021-04-09 DIAGNOSIS — M54.42 CHRONIC BILATERAL LOW BACK PAIN WITH BILATERAL SCIATICA: ICD-10-CM

## 2021-04-09 PROCEDURE — 1101F PT FALLS ASSESS-DOCD LE1/YR: CPT | Performed by: NURSE PRACTITIONER

## 2021-04-09 PROCEDURE — 3017F COLORECTAL CA SCREEN DOC REV: CPT | Performed by: NURSE PRACTITIONER

## 2021-04-09 PROCEDURE — G8432 DEP SCR NOT DOC, RNG: HCPCS | Performed by: NURSE PRACTITIONER

## 2021-04-09 PROCEDURE — G8427 DOCREV CUR MEDS BY ELIG CLIN: HCPCS | Performed by: NURSE PRACTITIONER

## 2021-04-09 PROCEDURE — 99214 OFFICE O/P EST MOD 30 MIN: CPT | Performed by: NURSE PRACTITIONER

## 2021-04-09 PROCEDURE — G8536 NO DOC ELDER MAL SCRN: HCPCS | Performed by: NURSE PRACTITIONER

## 2021-04-09 PROCEDURE — G8417 CALC BMI ABV UP PARAM F/U: HCPCS | Performed by: NURSE PRACTITIONER

## 2021-04-09 PROCEDURE — G8753 SYS BP > OR = 140: HCPCS | Performed by: NURSE PRACTITIONER

## 2021-04-09 PROCEDURE — G8754 DIAS BP LESS 90: HCPCS | Performed by: NURSE PRACTITIONER

## 2021-04-09 PROCEDURE — 1090F PRES/ABSN URINE INCON ASSESS: CPT | Performed by: NURSE PRACTITIONER

## 2021-04-09 PROCEDURE — G8399 PT W/DXA RESULTS DOCUMENT: HCPCS | Performed by: NURSE PRACTITIONER

## 2021-04-09 RX ORDER — TRAMADOL HYDROCHLORIDE 50 MG/1
50 TABLET ORAL
Qty: 12 TAB | Refills: 1 | Status: SHIPPED | OUTPATIENT
Start: 2021-04-09 | End: 2021-04-12

## 2021-04-09 RX ORDER — METHYLPREDNISOLONE 4 MG/1
TABLET ORAL
Qty: 1 DOSE PACK | Refills: 0 | Status: SHIPPED | OUTPATIENT
Start: 2021-04-09 | End: 2022-07-13

## 2021-04-09 RX ORDER — FUROSEMIDE 20 MG/1
20 TABLET ORAL 2 TIMES DAILY
Qty: 60 TAB | Refills: 0 | Status: SHIPPED | OUTPATIENT
Start: 2021-04-09 | End: 2021-04-27 | Stop reason: SDUPTHER

## 2021-04-09 NOTE — PROGRESS NOTES
Chief Complaint   Patient presents with    Follow-up     post vascular, edema    Peripheral Edema     Patient has seen the vascular doctor 2 or 3 times. She goes back on Monday. Stockings help as long as she has them on.  Medication Refill     Refills requested on tramadol and med dose pack      1. Have you been to the ER, urgent care clinic since your last visit? Hospitalized since your last visit? No    2. Have you seen or consulted any other health care providers outside of the [address] [place] [address] since your last visit? Include any pap smears or colon screening. Yes Reason for visit: Dr. Manuela Prescott, vascular      Visit Vitals  BP (!) 141/87 (BP 1 Location: Right arm, BP Patient Position: Sitting, BP Cuff Size: Adult long)   Pulse 71   Temp 97.7 °F (36.5 °C) (Temporal)   Resp 18   Ht 5' (1.524 m)   Wt 287 lb (130.2 kg)   SpO2 97%   BMI 56.05 kg/m²     Vital signs were take twice.   160/83

## 2021-04-09 NOTE — PROGRESS NOTES
Radha Sánchez (: 1950) is a 79 y.o. female, established patient, here for evaluation of the following chief complaint(s):  Follow-up, Peripheral Edema, and Medication Refill       ASSESSMENT/PLAN:  1. Edema, unspecified type  -     furosemide (LASIX) 20 mg tablet; Take 1 Tab by mouth two (2) times a day. Indications: visible water retention, Normal, Disp-60 Tab, R-0  2. Pain in both lower extremities  -     traMADoL (ULTRAM) 50 mg tablet; Take 1 Tab by mouth every six (6) hours as needed for Pain for up to 3 days. Max Daily Amount: 200 mg. Indications: pain, Normal, Disp-12 Tab, R-1  -     methylPREDNISolone (MEDROL DOSEPACK) 4 mg tablet; TAKE AS DIRECTED, Normal, Disp-1 Dose Pack, R-0  3. Essential hypertension  4. Chronic bilateral low back pain with bilateral sciatica    Concern for worsening edema, has been educated on the importance of maintaining good fluid balance and avoid severest swelling as this may complicate things and cause issues such as cellulitis. Due to the severity of her swelling she is to take Lasix by mouth twice a day until her follow-up appointment with vascular on Monday    Return in about 2 weeks (around 2021) for edema post vascular. SUBJECTIVE/OBJECTIVE:  HPI  Follow-up (post vascular, edema), Peripheral Edema (Patient has seen the vascular doctor 2 or 3 times. She goes back on Monday. Stockings help as long as she has them on.), and Medication Refill (Refills requested on tramadol and med dose pack)  Vascular f/u on 21  Has not been taking laxis as of late, more difficulty breathing and walking past few days  Does reports having a diet high in salt. Review of Systems   All other systems reviewed and are negative. Physical Exam  Constitutional:       Appearance: Normal appearance. She is obese. HENT:      Head: Normocephalic and atraumatic. Neck:      Musculoskeletal: Normal range of motion and neck supple.    Musculoskeletal:         General: Swelling and tenderness present. Right knee: She exhibits decreased range of motion and swelling. Tenderness found. Left knee: She exhibits decreased range of motion and swelling. Tenderness found. Right ankle: She exhibits decreased range of motion and swelling. Tenderness. Left ankle: She exhibits decreased range of motion and swelling. Tenderness. Right lower leg: She exhibits tenderness and swelling. Edema present. Left lower leg: She exhibits tenderness and swelling. Edema present. Comments: Ambulation with the aid of a cane and her    Skin:     General: Skin is warm and dry. Neurological:      General: No focal deficit present. Mental Status: She is alert and oriented to person, place, and time. Mental status is at baseline. Psychiatric:         Mood and Affect: Mood normal.         Behavior: Behavior normal.         Thought Content: Thought content normal.         Judgment: Judgment normal.           On this date 04/09/2021 I have spent 35 minutes reviewing previous notes, test results and face to face with the patient discussing the diagnosis and importance of compliance with the treatment plan as well as documenting on the day of the visit. An electronic signature was used to authenticate this note.   -- Anthony Robledo NP

## 2021-04-09 NOTE — PATIENT INSTRUCTIONS
Take the laxis twice day until Monday    Then as needed    However if you notice swelling, warmth do take twice a day again

## 2021-04-23 ENCOUNTER — TELEPHONE (OUTPATIENT)
Dept: FAMILY MEDICINE CLINIC | Age: 71
End: 2021-04-23

## 2021-04-23 RX ORDER — VALSARTAN AND HYDROCHLOROTHIAZIDE 320; 25 MG/1; MG/1
TABLET, FILM COATED ORAL
Qty: 90 TAB | Refills: 1 | Status: SHIPPED | OUTPATIENT
Start: 2021-04-23 | End: 2021-11-02

## 2021-04-27 ENCOUNTER — OFFICE VISIT (OUTPATIENT)
Dept: FAMILY MEDICINE CLINIC | Age: 71
End: 2021-04-27
Payer: MEDICARE

## 2021-04-27 VITALS
HEIGHT: 60 IN | DIASTOLIC BLOOD PRESSURE: 66 MMHG | OXYGEN SATURATION: 98 % | HEART RATE: 65 BPM | SYSTOLIC BLOOD PRESSURE: 127 MMHG | TEMPERATURE: 97.3 F | WEIGHT: 279 LBS | RESPIRATION RATE: 16 BRPM | BODY MASS INDEX: 54.77 KG/M2

## 2021-04-27 DIAGNOSIS — R60.9 EDEMA, UNSPECIFIED TYPE: ICD-10-CM

## 2021-04-27 DIAGNOSIS — I10 ESSENTIAL HYPERTENSION: Primary | ICD-10-CM

## 2021-04-27 DIAGNOSIS — M51.26 DISPLACEMENT OF LUMBAR INTERVERTEBRAL DISC WITHOUT MYELOPATHY: ICD-10-CM

## 2021-04-27 PROCEDURE — 1090F PRES/ABSN URINE INCON ASSESS: CPT | Performed by: NURSE PRACTITIONER

## 2021-04-27 PROCEDURE — G8752 SYS BP LESS 140: HCPCS | Performed by: NURSE PRACTITIONER

## 2021-04-27 PROCEDURE — G8536 NO DOC ELDER MAL SCRN: HCPCS | Performed by: NURSE PRACTITIONER

## 2021-04-27 PROCEDURE — 99214 OFFICE O/P EST MOD 30 MIN: CPT | Performed by: NURSE PRACTITIONER

## 2021-04-27 PROCEDURE — G8399 PT W/DXA RESULTS DOCUMENT: HCPCS | Performed by: NURSE PRACTITIONER

## 2021-04-27 PROCEDURE — G8427 DOCREV CUR MEDS BY ELIG CLIN: HCPCS | Performed by: NURSE PRACTITIONER

## 2021-04-27 PROCEDURE — 3017F COLORECTAL CA SCREEN DOC REV: CPT | Performed by: NURSE PRACTITIONER

## 2021-04-27 PROCEDURE — G8754 DIAS BP LESS 90: HCPCS | Performed by: NURSE PRACTITIONER

## 2021-04-27 PROCEDURE — G8417 CALC BMI ABV UP PARAM F/U: HCPCS | Performed by: NURSE PRACTITIONER

## 2021-04-27 PROCEDURE — G8432 DEP SCR NOT DOC, RNG: HCPCS | Performed by: NURSE PRACTITIONER

## 2021-04-27 PROCEDURE — 1101F PT FALLS ASSESS-DOCD LE1/YR: CPT | Performed by: NURSE PRACTITIONER

## 2021-04-27 RX ORDER — FUROSEMIDE 20 MG/1
20 TABLET ORAL 2 TIMES DAILY
Qty: 60 TAB | Refills: 0 | Status: SHIPPED | OUTPATIENT
Start: 2021-04-27 | End: 2021-05-28 | Stop reason: SDUPTHER

## 2021-04-27 NOTE — PROGRESS NOTES
Claudio Early (: 1950) is a 70 y.o. female, established patient, here for evaluation of the following chief complaint(s):  Follow-up (edema post vascular.), Peripheral Edema, and Medication Refill (verapamil and tramadol refills )       ASSESSMENT/PLAN:  Below is the assessment and plan developed based on review of pertinent history, physical exam, labs, studies, and medications. 1. Essential hypertension  -     verapamil ER (VERELAN) 240 mg ER capsule; Take 1 Cap by mouth daily. , Normal, Disp-90 Cap, R-1  2. Edema, unspecified type  -     furosemide (LASIX) 20 mg tablet; Take 1 Tab by mouth two (2) times a day. Indications: visible water retention, Normal, Disp-60 Tab, R-0  -     CBC WITH AUTOMATED DIFF  -     METABOLIC PANEL, COMPREHENSIVE  3. Displacement of lumbar intervertebral disc without myelopathy  -     traMADoL (ULTRAM) 50 mg tablet; Take 1 Tab by mouth every six (6) hours as needed for Pain for up to 90 days. Max Daily Amount: 200 mg. Indications: neuropathic pain, Normal, Disp-60 Tab, R-5      Return in about 4 weeks (around 2021) for edema post vascular, laxix bid. SUBJECTIVE/OBJECTIVE:  HPI  Patient presenting for hypertension followup, chronic pain. Taking laxis twice a day, with improvement of swelling of the legs. Seeing vascular who is doing daily treatments of her legs. Continues to wear compression stockings daily and reports that when she removes her stockings legs start swelling up again. Does report when she sleep at night swelling does improve but when she gets up and start moving around swelling gets worse again. Patient reports blood pressures at home most frequently normal. Patient has symptoms of shortness of breath, weakness, myalgias, leg swelling, fatigue. Patient reports good compliance with medications, no side effects from medications noted. Current treatments include diet modification, weight loss.        Review of Systems   All other systems reviewed and are negative. Visit Vitals  /66 (BP 1 Location: Right arm, BP Patient Position: Sitting, BP Cuff Size: Adult)   Pulse 65   Temp 97.3 °F (36.3 °C) (Temporal)   Resp 16   Ht 5' (1.524 m)   Wt 279 lb (126.6 kg)   SpO2 98%   BMI 54.49 kg/m²         Physical Exam  Constitutional:       Appearance: Normal appearance. She is obese. HENT:      Head: Normocephalic and atraumatic. Eyes:      Pupils: Pupils are equal, round, and reactive to light. Neck:      Musculoskeletal: Normal range of motion and neck supple. Cardiovascular:      Rate and Rhythm: Normal rate and regular rhythm. Pulses: Normal pulses. Heart sounds: Normal heart sounds. Pulmonary:      Effort: Pulmonary effort is normal.      Breath sounds: Normal breath sounds. Abdominal:      General: Bowel sounds are normal.   Musculoskeletal:         General: Swelling and tenderness present. Right lower leg: Edema present. Left lower leg: Edema present. Neurological:      General: No focal deficit present. Mental Status: She is alert and oriented to person, place, and time. Mental status is at baseline. On this date 04/27/2021 I have spent 35 minutes reviewing previous notes, test results and face to face with the patient discussing the diagnosis and importance of compliance with the treatment plan as well as documenting on the day of the visit. An electronic signature was used to authenticate this note.   -- Ronald Mathias NP

## 2021-04-27 NOTE — PROGRESS NOTES
Chief Complaint   Patient presents with    Follow-up     edema post vascular.  Peripheral Edema    Medication Refill     verapamil and tramadol refills      1. Have you been to the ER, urgent care clinic since your last visit? Hospitalized since your last visit? No    2. Have you seen or consulted any other health care providers outside of the [address] [place] [address] since your last visit? Include any pap smears or colon screening. Yes Reason for visit: [facility] - Vascular - Dr. Hector Metz for procedures on both lower extremities.        Visit Vitals  /66 (BP 1 Location: Right arm, BP Patient Position: Sitting, BP Cuff Size: Adult)   Pulse 65   Temp 97.3 °F (36.3 °C) (Temporal)   Resp 16   Ht 5' (1.524 m)   Wt 279 lb (126.6 kg)   SpO2 98%   BMI 54.49 kg/m²

## 2021-04-30 RX ORDER — VERAPAMIL HYDROCHLORIDE 240 MG/1
240 CAPSULE, EXTENDED RELEASE ORAL DAILY
Qty: 90 CAP | Refills: 1 | Status: SHIPPED | OUTPATIENT
Start: 2021-04-30 | End: 2022-01-25

## 2021-04-30 RX ORDER — TRAMADOL HYDROCHLORIDE 50 MG/1
50 TABLET ORAL
Qty: 60 TAB | Refills: 5 | Status: SHIPPED | OUTPATIENT
Start: 2021-04-30 | End: 2021-05-28 | Stop reason: SDUPTHER

## 2021-05-15 RX ORDER — ALLOPURINOL 100 MG/1
TABLET ORAL
Qty: 90 TAB | Refills: 1 | Status: SHIPPED | OUTPATIENT
Start: 2021-05-15 | End: 2021-09-14 | Stop reason: SDUPTHER

## 2021-05-17 ENCOUNTER — TELEPHONE (OUTPATIENT)
Dept: FAMILY MEDICINE CLINIC | Age: 71
End: 2021-05-17

## 2021-05-17 DIAGNOSIS — M51.26 DISPLACEMENT OF LUMBAR INTERVERTEBRAL DISC WITHOUT MYELOPATHY: ICD-10-CM

## 2021-05-17 RX ORDER — TRAMADOL HYDROCHLORIDE 50 MG/1
50 TABLET ORAL
Qty: 120 TAB | Refills: 5 | Status: CANCELLED | OUTPATIENT
Start: 2021-05-17 | End: 2021-11-13

## 2021-05-17 NOTE — PROGRESS NOTES
This not has been reviewed. I attest that I was available for consultation via telephone, Epic or in person. I will continue to follow this patient with Dr Justice Castle.    Shoaib Clark, DO

## 2021-05-17 NOTE — TELEPHONE ENCOUNTER
Called to remind patient of her lab work that needs completed before her next visit. She stated her tramadol has been cut in half and that she needs this medication with the procedures she is having on her legs. She is requesting a new script.

## 2021-05-26 LAB
ALBUMIN SERPL-MCNC: 3.6 G/DL (ref 3.7–4.7)
ALBUMIN/GLOB SERPL: 1.1 {RATIO} (ref 1.2–2.2)
ALP SERPL-CCNC: 71 IU/L (ref 48–121)
ALT SERPL-CCNC: 15 IU/L (ref 0–32)
AST SERPL-CCNC: 19 IU/L (ref 0–40)
BASOPHILS # BLD AUTO: 0 X10E3/UL (ref 0–0.2)
BASOPHILS NFR BLD AUTO: 1 %
BILIRUB SERPL-MCNC: 0.5 MG/DL (ref 0–1.2)
BUN SERPL-MCNC: 16 MG/DL (ref 8–27)
BUN/CREAT SERPL: 20 (ref 12–28)
CALCIUM SERPL-MCNC: 11.5 MG/DL (ref 8.7–10.3)
CHLORIDE SERPL-SCNC: 99 MMOL/L (ref 96–106)
CO2 SERPL-SCNC: 29 MMOL/L (ref 20–29)
CREAT SERPL-MCNC: 0.8 MG/DL (ref 0.57–1)
EOSINOPHIL # BLD AUTO: 0.3 X10E3/UL (ref 0–0.4)
EOSINOPHIL NFR BLD AUTO: 5 %
ERYTHROCYTE [DISTWIDTH] IN BLOOD BY AUTOMATED COUNT: 14.6 % (ref 11.7–15.4)
GLOBULIN SER CALC-MCNC: 3.4 G/DL (ref 1.5–4.5)
GLUCOSE SERPL-MCNC: 91 MG/DL (ref 65–99)
HCT VFR BLD AUTO: 37.1 % (ref 34–46.6)
HGB BLD-MCNC: 12 G/DL (ref 11.1–15.9)
IMM GRANULOCYTES # BLD AUTO: 0 X10E3/UL (ref 0–0.1)
IMM GRANULOCYTES NFR BLD AUTO: 0 %
LYMPHOCYTES # BLD AUTO: 1.9 X10E3/UL (ref 0.7–3.1)
LYMPHOCYTES NFR BLD AUTO: 34 %
MCH RBC QN AUTO: 29.1 PG (ref 26.6–33)
MCHC RBC AUTO-ENTMCNC: 32.3 G/DL (ref 31.5–35.7)
MCV RBC AUTO: 90 FL (ref 79–97)
MONOCYTES # BLD AUTO: 0.7 X10E3/UL (ref 0.1–0.9)
MONOCYTES NFR BLD AUTO: 13 %
NEUTROPHILS # BLD AUTO: 2.7 X10E3/UL (ref 1.4–7)
NEUTROPHILS NFR BLD AUTO: 47 %
PLATELET # BLD AUTO: 227 X10E3/UL (ref 150–450)
POTASSIUM SERPL-SCNC: 3.9 MMOL/L (ref 3.5–5.2)
PROT SERPL-MCNC: 7 G/DL (ref 6–8.5)
RBC # BLD AUTO: 4.12 X10E6/UL (ref 3.77–5.28)
SODIUM SERPL-SCNC: 139 MMOL/L (ref 134–144)
WBC # BLD AUTO: 5.6 X10E3/UL (ref 3.4–10.8)

## 2021-05-28 ENCOUNTER — OFFICE VISIT (OUTPATIENT)
Dept: FAMILY MEDICINE CLINIC | Age: 71
End: 2021-05-28
Payer: MEDICARE

## 2021-05-28 VITALS
SYSTOLIC BLOOD PRESSURE: 130 MMHG | RESPIRATION RATE: 18 BRPM | HEIGHT: 60 IN | WEIGHT: 279 LBS | HEART RATE: 71 BPM | TEMPERATURE: 97.8 F | OXYGEN SATURATION: 97 % | BODY MASS INDEX: 54.77 KG/M2 | DIASTOLIC BLOOD PRESSURE: 75 MMHG

## 2021-05-28 DIAGNOSIS — R60.9 EDEMA, UNSPECIFIED TYPE: ICD-10-CM

## 2021-05-28 DIAGNOSIS — E87.70 LOCALIZED EDEMA DUE TO FLUID OVERLOAD: Primary | ICD-10-CM

## 2021-05-28 DIAGNOSIS — M51.26 DISPLACEMENT OF LUMBAR INTERVERTEBRAL DISC WITHOUT MYELOPATHY: ICD-10-CM

## 2021-05-28 DIAGNOSIS — I10 ESSENTIAL HYPERTENSION: ICD-10-CM

## 2021-05-28 PROCEDURE — G8399 PT W/DXA RESULTS DOCUMENT: HCPCS | Performed by: NURSE PRACTITIONER

## 2021-05-28 PROCEDURE — G8427 DOCREV CUR MEDS BY ELIG CLIN: HCPCS | Performed by: NURSE PRACTITIONER

## 2021-05-28 PROCEDURE — G8752 SYS BP LESS 140: HCPCS | Performed by: NURSE PRACTITIONER

## 2021-05-28 PROCEDURE — G8754 DIAS BP LESS 90: HCPCS | Performed by: NURSE PRACTITIONER

## 2021-05-28 PROCEDURE — G8536 NO DOC ELDER MAL SCRN: HCPCS | Performed by: NURSE PRACTITIONER

## 2021-05-28 PROCEDURE — G8417 CALC BMI ABV UP PARAM F/U: HCPCS | Performed by: NURSE PRACTITIONER

## 2021-05-28 PROCEDURE — 99214 OFFICE O/P EST MOD 30 MIN: CPT | Performed by: NURSE PRACTITIONER

## 2021-05-28 PROCEDURE — 1090F PRES/ABSN URINE INCON ASSESS: CPT | Performed by: NURSE PRACTITIONER

## 2021-05-28 PROCEDURE — 3017F COLORECTAL CA SCREEN DOC REV: CPT | Performed by: NURSE PRACTITIONER

## 2021-05-28 PROCEDURE — 1101F PT FALLS ASSESS-DOCD LE1/YR: CPT | Performed by: NURSE PRACTITIONER

## 2021-05-28 PROCEDURE — G8432 DEP SCR NOT DOC, RNG: HCPCS | Performed by: NURSE PRACTITIONER

## 2021-05-28 RX ORDER — TRAMADOL HYDROCHLORIDE 50 MG/1
50 TABLET ORAL
Qty: 120 TABLET | Refills: 5 | Status: SHIPPED | OUTPATIENT
Start: 2021-05-28 | End: 2021-06-27

## 2021-05-28 RX ORDER — FUROSEMIDE 20 MG/1
20 TABLET ORAL 2 TIMES DAILY
Qty: 180 TABLET | Refills: 0 | Status: SHIPPED | OUTPATIENT
Start: 2021-05-28 | End: 2022-06-03

## 2021-05-28 NOTE — PROGRESS NOTES
Rosamaria Mireles (: 1950) is a 70 y.o. female, established patient, here for evaluation of the following chief complaint(s):  Follow-up (Return in about 4 weeks (around 2021) for edema post vascular, lasix bid), Peripheral Edema, and Medication Management       ASSESSMENT/PLAN:  Below is the assessment and plan developed based on review of pertinent history, physical exam, labs, studies, and medications. 1. Localized edema due to fluid overload  2. Essential hypertension  3. Edema, unspecified type  -     furosemide (LASIX) 20 mg tablet; Take 1 Tablet by mouth two (2) times a day. Indications: visible water retention, Normal, Disp-180 Tablet, R-0  4. Displacement of lumbar intervertebral disc without myelopathy  -     traMADoL (ULTRAM) 50 mg tablet; Take 1 Tablet by mouth every six (6) hours as needed for Pain for up to 30 days. Max Daily Amount: 200 mg. Indications: neuropathic pain, Normal, Disp-120 Tablet, R-5      Return in about 3 months (around 2021) for HTN, edema, post vascular. SUBJECTIVE/OBJECTIVE:  HPI  Patient presenting for hypertension followup, obesity evaluation. 136/81 at home then goes up from there  Vascular doing treatments that are managed edema of the legs which have been improving pain and mobility  Using compression at home and when leaving the house to do ADLs  Next f/u in 6 weeks with vascular surgeon. Reports lower extremity pain has greatly improved   Patient reports blood pressures at home most frequently not checked. Patient has symptoms of none of the following; no chest pain, shortness of breath, weakness, orthostatic hypotension, cough, myalgias, rash, headaches, weight gain, leg swelling, palpitations, slow heart rate, fatigue, depression. Patient reports good compliance with medications, no side effects from medications noted. Current treatments include diet modification. Review of Systems   All other systems reviewed and are negative.     Visit Vitals  /75 (BP 1 Location: Right arm, BP Patient Position: Sitting, BP Cuff Size: Adult)   Pulse 71   Temp 97.8 °F (36.6 °C) (Temporal)   Resp 18   Ht 5' (1.524 m)   Wt 279 lb (126.6 kg)   SpO2 97%   BMI 54.49 kg/m²     Physical Exam  Constitutional:       Appearance: She is obese. Cardiovascular:      Rate and Rhythm: Normal rate and regular rhythm. Pulmonary:      Effort: Pulmonary effort is normal.      Breath sounds: Normal breath sounds. Musculoskeletal:         General: Swelling and tenderness present. Cervical back: Normal range of motion. Right lower leg: Edema present. Left lower leg: Edema present. Comments: Limited mobility ambulates with assistance of a cane. Gets up from the chair with assistance of her . Slow steady gait. Skin:     General: Skin is warm and dry. Capillary Refill: Capillary refill takes less than 2 seconds. Neurological:      General: No focal deficit present. Mental Status: She is alert and oriented to person, place, and time. Mental status is at baseline. Gait: Gait abnormal.   Psychiatric:         Mood and Affect: Mood normal.         Behavior: Behavior normal.         Thought Content: Thought content normal.         Judgment: Judgment normal.       On this date 05/28/2021 I have spent 31 minutes reviewing previous notes, test results and face to face with the patient discussing the diagnosis and importance of compliance with the treatment plan as well as documenting on the day of the visit. An electronic signature was used to authenticate this note.   -- Anthony Robledo NP

## 2021-05-28 NOTE — PROGRESS NOTES
Chief Complaint   Patient presents with    Follow-up     Return in about 4 weeks (around 5/25/2021) for edema post vascular, lasix bid    Peripheral Edema    Medication Management     1. Have you been to the ER, urgent care clinic since your last visit? Hospitalized since your last visit? NO    2. Have you seen or consulted any other health care providers outside of the [address] [place] [address] since your last visit? Include any pap smears or colon screening.  Yes Reason for visit: Vascular Surgery       Visit Vitals  /83 (BP 1 Location: Right arm, BP Patient Position: Sitting, BP Cuff Size: Adult)   Pulse 82   Temp 97.8 °F (36.6 °C) (Temporal)   Resp 18   Ht 5' (1.524 m)   Wt 279 lb (126.6 kg)   SpO2 97%   BMI 54.49 kg/m²     Visit Vitals  /75 (BP 1 Location: Right arm, BP Patient Position: Sitting, BP Cuff Size: Adult)   Pulse 71   Temp 97.8 °F (36.6 °C) (Temporal)   Resp 18   Ht 5' (1.524 m)   Wt 279 lb (126.6 kg)   SpO2 97%   BMI 54.49 kg/m²

## 2021-05-29 RX ORDER — INDOMETHACIN 50 MG/1
CAPSULE ORAL
Qty: 180 CAPSULE | Refills: 3 | Status: SHIPPED | OUTPATIENT
Start: 2021-05-29 | End: 2022-07-13

## 2021-08-03 PROBLEM — E78.5 HYPERLIPIDEMIA: Status: RESOLVED | Noted: 2020-06-25 | Resolved: 2021-08-03

## 2021-09-14 ENCOUNTER — OFFICE VISIT (OUTPATIENT)
Dept: FAMILY MEDICINE CLINIC | Age: 71
End: 2021-09-14
Payer: MEDICARE

## 2021-09-14 VITALS
HEART RATE: 72 BPM | SYSTOLIC BLOOD PRESSURE: 126 MMHG | DIASTOLIC BLOOD PRESSURE: 72 MMHG | WEIGHT: 264 LBS | TEMPERATURE: 98.1 F | OXYGEN SATURATION: 97 % | BODY MASS INDEX: 51.56 KG/M2

## 2021-09-14 DIAGNOSIS — E87.70 LOCALIZED EDEMA DUE TO FLUID OVERLOAD: ICD-10-CM

## 2021-09-14 DIAGNOSIS — R60.9 EDEMA, UNSPECIFIED TYPE: ICD-10-CM

## 2021-09-14 DIAGNOSIS — E78.5 HYPERLIPIDEMIA, UNSPECIFIED HYPERLIPIDEMIA TYPE: ICD-10-CM

## 2021-09-14 DIAGNOSIS — M1A.9XX0 CHRONIC GOUT WITHOUT TOPHUS, UNSPECIFIED CAUSE, UNSPECIFIED SITE: Primary | ICD-10-CM

## 2021-09-14 DIAGNOSIS — I10 ESSENTIAL HYPERTENSION: ICD-10-CM

## 2021-09-14 DIAGNOSIS — E66.01 MORBID OBESITY WITH BMI OF 50.0-59.9, ADULT (HCC): ICD-10-CM

## 2021-09-14 PROCEDURE — G8754 DIAS BP LESS 90: HCPCS | Performed by: NURSE PRACTITIONER

## 2021-09-14 PROCEDURE — G8427 DOCREV CUR MEDS BY ELIG CLIN: HCPCS | Performed by: NURSE PRACTITIONER

## 2021-09-14 PROCEDURE — 99214 OFFICE O/P EST MOD 30 MIN: CPT | Performed by: NURSE PRACTITIONER

## 2021-09-14 PROCEDURE — 3017F COLORECTAL CA SCREEN DOC REV: CPT | Performed by: NURSE PRACTITIONER

## 2021-09-14 PROCEDURE — G8417 CALC BMI ABV UP PARAM F/U: HCPCS | Performed by: NURSE PRACTITIONER

## 2021-09-14 PROCEDURE — G8432 DEP SCR NOT DOC, RNG: HCPCS | Performed by: NURSE PRACTITIONER

## 2021-09-14 PROCEDURE — G8752 SYS BP LESS 140: HCPCS | Performed by: NURSE PRACTITIONER

## 2021-09-14 PROCEDURE — G8399 PT W/DXA RESULTS DOCUMENT: HCPCS | Performed by: NURSE PRACTITIONER

## 2021-09-14 PROCEDURE — 1090F PRES/ABSN URINE INCON ASSESS: CPT | Performed by: NURSE PRACTITIONER

## 2021-09-14 PROCEDURE — 1101F PT FALLS ASSESS-DOCD LE1/YR: CPT | Performed by: NURSE PRACTITIONER

## 2021-09-14 PROCEDURE — G8536 NO DOC ELDER MAL SCRN: HCPCS | Performed by: NURSE PRACTITIONER

## 2021-09-14 RX ORDER — TRAMADOL HYDROCHLORIDE 50 MG/1
TABLET ORAL
COMMUNITY
Start: 2021-08-11 | End: 2021-12-22

## 2021-09-14 RX ORDER — ALLOPURINOL 100 MG/1
100 TABLET ORAL DAILY
Qty: 90 TABLET | Refills: 1 | Status: SHIPPED | OUTPATIENT
Start: 2021-09-14 | End: 2022-05-06

## 2021-09-14 RX ORDER — SPIRONOLACTONE 25 MG/1
25 TABLET ORAL DAILY
COMMUNITY
Start: 2021-08-27 | End: 2022-07-13

## 2021-09-14 NOTE — PROGRESS NOTES
Royal Tucker (: 1950) is a 70 y.o. female, established patient, here for evaluation of the following chief complaint(s):  Peripheral Edema, Cholesterol Problem, and Hypertension    ASSESSMENT/PLAN:  Below is the assessment and plan developed based on review of pertinent history, physical exam, labs, studies, and medications. 1. Chronic gout without tophus, unspecified cause, unspecified site  -     allopurinoL (ZYLOPRIM) 100 mg tablet; Take 1 Tablet by mouth daily. , Normal, Disp-90 Tablet, R-1  2. Edema, unspecified type  3. Essential hypertension  -     CBC WITH AUTOMATED DIFF  -     METABOLIC PANEL, COMPREHENSIVE  -     LIPID PANEL  -     THYROID CASCADE PROFILE  -     MICROALBUMIN, UR, RAND W/ MICROALB/CREAT RATIO  4. Localized edema due to fluid overload  5. Morbid obesity with BMI of 50.0-59.9, adult ([facility] [address]  6. Hyperlipidemia, unspecified hyperlipidemia type  -     CBC WITH AUTOMATED DIFF  -     METABOLIC PANEL, COMPREHENSIVE  -     LIPID PANEL  -     THYROID CASCADE PROFILE    Patient currently stable we will not make any changes to current regimen    Patient due for routine labs at this time    In the meantime continue with medication as directed    Return in about 3 months (around 2021) for medicare wellness, Hyperlipidemia, HTN, Lab review. SUBJECTIVE/OBJECTIVE:  HPI  Patient presenting for hypertension followup, hyperlipidemia check. Patient reports blood pressures at home most frequently not checked. Patient has symptoms of none of the following; no chest pain, shortness of breath, weakness, orthostatic hypotension, cough, myalgias, rash, headaches, weight gain, leg swelling, palpitations, slow heart rate, fatigue, depression. Patient reports good compliance with medications, no side effects from medications noted. Current treatments include diet modification, weight loss. Review of Systems  All other systems reviewed and are negative.   Visit Vitals  /72 (BP 1 Location: Right arm, BP Patient Position: Sitting, BP Cuff Size: Adult)   Pulse 72   Temp 98.1 °F (36.7 °C) (Skin)   Wt 264 lb (119.7 kg)   SpO2 97%   BMI 51.56 kg/m²       Physical Exam  Constitutional:  No acute distress  HEENT:  Head normocephalic and atraumatic. CV:  Regular rate and rhythm. No murmur. Respiratory:  Lungs clear to auscultation bilaterally  Abdomen:  Soft, non-tender. Skin:  Normal color. Warm and Dry  Extremities:  Non-tender. No pedal edema. Back:  No tenderness  Neuro:  No gross motor deficits    On this date 09/14/2021 I have spent 31 minutes reviewing previous notes, test results and face to face with the patient discussing the diagnosis and importance of compliance with the treatment plan as well as documenting on the day of the visit. Aspects of this note may have been generated using voice recognition software. Despite editing, there may be some syntax errors. An electronic signature was used to authenticate this note.   -- Radha Corrigan NP

## 2021-09-15 LAB
ALBUMIN SERPL-MCNC: 4 G/DL (ref 3.7–4.7)
ALBUMIN/GLOB SERPL: 1 {RATIO} (ref 1.2–2.2)
ALP SERPL-CCNC: 93 IU/L (ref 44–121)
ALT SERPL-CCNC: 16 IU/L (ref 0–32)
AST SERPL-CCNC: 19 IU/L (ref 0–40)
BASOPHILS # BLD AUTO: 0 X10E3/UL (ref 0–0.2)
BASOPHILS NFR BLD AUTO: 1 %
BILIRUB SERPL-MCNC: 0.5 MG/DL (ref 0–1.2)
BUN SERPL-MCNC: 25 MG/DL (ref 8–27)
BUN/CREAT SERPL: 28 (ref 12–28)
CALCIUM SERPL-MCNC: 12.3 MG/DL (ref 8.7–10.3)
CHLORIDE SERPL-SCNC: 98 MMOL/L (ref 96–106)
CHOLEST SERPL-MCNC: 193 MG/DL (ref 100–199)
CO2 SERPL-SCNC: 19 MMOL/L (ref 20–29)
CREAT SERPL-MCNC: 0.9 MG/DL (ref 0.57–1)
EOSINOPHIL # BLD AUTO: 0.2 X10E3/UL (ref 0–0.4)
EOSINOPHIL NFR BLD AUTO: 3 %
ERYTHROCYTE [DISTWIDTH] IN BLOOD BY AUTOMATED COUNT: 15.7 % (ref 11.7–15.4)
GLOBULIN SER CALC-MCNC: 4.2 G/DL (ref 1.5–4.5)
GLUCOSE SERPL-MCNC: 64 MG/DL (ref 65–99)
HCT VFR BLD AUTO: 34.5 % (ref 34–46.6)
HDLC SERPL-MCNC: 66 MG/DL
HGB BLD-MCNC: 11.2 G/DL (ref 11.1–15.9)
IMM GRANULOCYTES # BLD AUTO: 0.1 X10E3/UL (ref 0–0.1)
IMM GRANULOCYTES NFR BLD AUTO: 1 %
LDLC SERPL CALC-MCNC: 110 MG/DL (ref 0–99)
LYMPHOCYTES # BLD AUTO: 2.1 X10E3/UL (ref 0.7–3.1)
LYMPHOCYTES NFR BLD AUTO: 38 %
MCH RBC QN AUTO: 28.7 PG (ref 26.6–33)
MCHC RBC AUTO-ENTMCNC: 32.5 G/DL (ref 31.5–35.7)
MCV RBC AUTO: 89 FL (ref 79–97)
MONOCYTES # BLD AUTO: 0.4 X10E3/UL (ref 0.1–0.9)
MONOCYTES NFR BLD AUTO: 8 %
NEUTROPHILS # BLD AUTO: 2.6 X10E3/UL (ref 1.4–7)
NEUTROPHILS NFR BLD AUTO: 49 %
PLATELET # BLD AUTO: 288 X10E3/UL (ref 150–450)
POTASSIUM SERPL-SCNC: 4.5 MMOL/L (ref 3.5–5.2)
PROT SERPL-MCNC: 8.2 G/DL (ref 6–8.5)
RBC # BLD AUTO: 3.9 X10E6/UL (ref 3.77–5.28)
SODIUM SERPL-SCNC: 139 MMOL/L (ref 134–144)
SPECIMEN STATUS REPORT, ROLRST: NORMAL
TRIGL SERPL-MCNC: 92 MG/DL (ref 0–149)
TSH SERPL DL<=0.005 MIU/L-ACNC: 2.72 UIU/ML (ref 0.45–4.5)
VLDLC SERPL CALC-MCNC: 17 MG/DL (ref 5–40)
WBC # BLD AUTO: 5.4 X10E3/UL (ref 3.4–10.8)

## 2021-11-02 RX ORDER — VALSARTAN AND HYDROCHLOROTHIAZIDE 320; 25 MG/1; MG/1
TABLET, FILM COATED ORAL
Qty: 90 TABLET | Refills: 1 | Status: SHIPPED | OUTPATIENT
Start: 2021-11-02 | End: 2022-06-14

## 2021-12-16 DIAGNOSIS — M1A.9XX0 CHRONIC GOUT WITHOUT TOPHUS, UNSPECIFIED CAUSE, UNSPECIFIED SITE: Primary | ICD-10-CM

## 2021-12-16 RX ORDER — TRAMADOL HYDROCHLORIDE 50 MG/1
TABLET ORAL
Qty: 120 TABLET | Status: CANCELLED | OUTPATIENT
Start: 2021-12-16

## 2021-12-20 ENCOUNTER — TELEPHONE (OUTPATIENT)
Dept: FAMILY MEDICINE CLINIC | Age: 71
End: 2021-12-20

## 2021-12-22 RX ORDER — TRAMADOL HYDROCHLORIDE 50 MG/1
TABLET ORAL
Qty: 120 TABLET | Refills: 2 | Status: SHIPPED | OUTPATIENT
Start: 2021-12-22 | End: 2022-03-23

## 2022-01-18 DIAGNOSIS — I10 ESSENTIAL HYPERTENSION: ICD-10-CM

## 2022-01-20 DIAGNOSIS — I10 ESSENTIAL HYPERTENSION: ICD-10-CM

## 2022-01-20 NOTE — TELEPHONE ENCOUNTER
Patient called and stated she needs this medication Verapamil. Can Jenna Gutierrez please send this prescription in. Please let patient know when this has been done.

## 2022-01-25 RX ORDER — VERAPAMIL HYDROCHLORIDE 240 MG/1
CAPSULE, EXTENDED RELEASE ORAL
Qty: 90 CAPSULE | Refills: 1 | Status: SHIPPED | OUTPATIENT
Start: 2022-01-25

## 2022-02-01 DIAGNOSIS — J20.9 ACUTE BRONCHITIS, UNSPECIFIED ORGANISM: Primary | ICD-10-CM

## 2022-02-01 RX ORDER — CEPHALEXIN 500 MG/1
500 CAPSULE ORAL 4 TIMES DAILY
Qty: 24 CAPSULE | Refills: 0 | Status: SHIPPED | OUTPATIENT
Start: 2022-02-01 | End: 2022-02-07

## 2022-02-01 RX ORDER — PROMETHAZINE HYDROCHLORIDE, PHENYLEPHRINE HYDROCHLORIDE AND CODEINE PHOSPHATE 6.25; 5; 1 MG/5ML; MG/5ML; MG/5ML
5 SOLUTION ORAL
Qty: 118 ML | Refills: 0 | Status: SHIPPED | OUTPATIENT
Start: 2022-02-01 | End: 2022-02-07

## 2022-03-18 PROBLEM — E83.52 HYPERCALCEMIA: Status: ACTIVE | Noted: 2020-08-22

## 2022-03-18 PROBLEM — M43.10 SPONDYLOLISTHESIS: Status: ACTIVE | Noted: 2018-09-21

## 2022-03-18 PROBLEM — E66.01 MORBID OBESITY (HCC): Status: ACTIVE | Noted: 2020-08-14

## 2022-03-18 PROBLEM — M79.601 PAIN IN RIGHT ARM: Status: ACTIVE | Noted: 2018-05-01

## 2022-03-19 PROBLEM — M54.40 LUMBAGO WITH SCIATICA: Status: ACTIVE | Noted: 2018-05-01

## 2022-03-19 PROBLEM — M1A.00X0 CHRONIC GOUTY ARTHRITIS: Status: ACTIVE | Noted: 2020-08-22

## 2022-03-19 PROBLEM — R73.02 IMPAIRED GLUCOSE TOLERANCE: Status: ACTIVE | Noted: 2020-08-22

## 2022-03-19 PROBLEM — E78.5 DYSLIPIDEMIA: Status: ACTIVE | Noted: 2017-03-09

## 2022-03-19 PROBLEM — H54.7 VISUAL IMPAIRMENT: Status: ACTIVE | Noted: 2020-08-22

## 2022-03-19 PROBLEM — R60.9 DEPENDENT EDEMA: Status: ACTIVE | Noted: 2020-08-22

## 2022-03-19 PROBLEM — E87.70 LOCALIZED EDEMA DUE TO FLUID OVERLOAD: Status: ACTIVE | Noted: 2020-06-25

## 2022-03-19 PROBLEM — G58.8 INTERCOSTAL NEURALGIA: Status: ACTIVE | Noted: 2018-08-07

## 2022-03-19 PROBLEM — M1A.9XX0 CHRONIC GOUTY ARTHRITIS: Status: ACTIVE | Noted: 2020-08-22

## 2022-03-19 PROBLEM — M25.569 KNEE PAIN: Status: ACTIVE | Noted: 2020-06-25

## 2022-03-19 PROBLEM — I89.0 LYMPHEDEMA: Status: ACTIVE | Noted: 2021-01-01

## 2022-03-19 PROBLEM — M79.9 DISORDER OF SOFT TISSUE: Status: ACTIVE | Noted: 2018-05-01

## 2022-03-19 PROBLEM — M10.9 GOUT: Status: ACTIVE | Noted: 2020-06-25

## 2022-03-20 PROBLEM — M51.26 DISPLACEMENT OF LUMBAR INTERVERTEBRAL DISC WITHOUT MYELOPATHY: Status: ACTIVE | Noted: 2018-09-21

## 2022-03-20 PROBLEM — I10 ESSENTIAL HYPERTENSION: Status: ACTIVE | Noted: 2020-06-25

## 2022-03-22 DIAGNOSIS — M1A.9XX0 CHRONIC GOUT WITHOUT TOPHUS, UNSPECIFIED CAUSE, UNSPECIFIED SITE: ICD-10-CM

## 2022-03-23 RX ORDER — TRAMADOL HYDROCHLORIDE 50 MG/1
TABLET ORAL
Qty: 120 TABLET | Refills: 2 | Status: SHIPPED | OUTPATIENT
Start: 2022-03-23 | End: 2022-04-22

## 2022-03-28 ENCOUNTER — TELEPHONE (OUTPATIENT)
Dept: FAMILY MEDICINE CLINIC | Age: 72
End: 2022-03-28

## 2022-03-28 NOTE — TELEPHONE ENCOUNTER
Notified pt that request for ultram was approved. However, for further refills she will need to make an appointment.

## 2022-05-04 DIAGNOSIS — M1A.9XX0 CHRONIC GOUT WITHOUT TOPHUS, UNSPECIFIED CAUSE, UNSPECIFIED SITE: ICD-10-CM

## 2022-05-06 RX ORDER — ALLOPURINOL 100 MG/1
100 TABLET ORAL DAILY
Qty: 90 TABLET | Refills: 1 | Status: SHIPPED | OUTPATIENT
Start: 2022-05-06

## 2022-05-26 DIAGNOSIS — R60.9 EDEMA, UNSPECIFIED TYPE: ICD-10-CM

## 2022-06-02 DIAGNOSIS — R60.9 EDEMA, UNSPECIFIED TYPE: ICD-10-CM

## 2022-06-03 ENCOUNTER — TELEPHONE (OUTPATIENT)
Dept: FAMILY MEDICINE CLINIC | Age: 72
End: 2022-06-03

## 2022-06-03 DIAGNOSIS — M1A.9XX0 CHRONIC GOUT WITHOUT TOPHUS, UNSPECIFIED CAUSE, UNSPECIFIED SITE: ICD-10-CM

## 2022-06-03 RX ORDER — FUROSEMIDE 20 MG/1
TABLET ORAL
Qty: 180 TABLET | Refills: 0 | Status: SHIPPED | OUTPATIENT
Start: 2022-06-03 | End: 2022-07-13 | Stop reason: SDUPTHER

## 2022-06-03 RX ORDER — FUROSEMIDE 20 MG/1
TABLET ORAL
Qty: 180 TABLET | Refills: 0 | Status: SHIPPED | OUTPATIENT
Start: 2022-06-03

## 2022-06-14 RX ORDER — VALSARTAN AND HYDROCHLOROTHIAZIDE 320; 25 MG/1; MG/1
TABLET, FILM COATED ORAL
Qty: 90 TABLET | Refills: 1 | Status: SHIPPED | OUTPATIENT
Start: 2022-06-14

## 2022-07-06 DIAGNOSIS — M1A.9XX0 CHRONIC GOUT WITHOUT TOPHUS, UNSPECIFIED CAUSE, UNSPECIFIED SITE: Primary | ICD-10-CM

## 2022-07-06 RX ORDER — TRAMADOL HYDROCHLORIDE 50 MG/1
50 TABLET ORAL
COMMUNITY
End: 2022-07-06 | Stop reason: SDUPTHER

## 2022-07-06 NOTE — TELEPHONE ENCOUNTER
Called and spoke to pt on 3/28/22 in regards to needing to be seen prior to refills.  She would like to know if she can get enough until she is seen on the 12th    Last OV: 9/14/21  Next OV: 7/12/21  Last Refill: 12/22/2021

## 2022-07-07 RX ORDER — TRAMADOL HYDROCHLORIDE 50 MG/1
50 TABLET ORAL
Qty: 120 TABLET | Refills: 2 | Status: SHIPPED | OUTPATIENT
Start: 2022-07-07 | End: 2022-09-27

## 2022-07-13 ENCOUNTER — OFFICE VISIT (OUTPATIENT)
Dept: FAMILY MEDICINE CLINIC | Age: 72
End: 2022-07-13
Payer: MEDICARE

## 2022-07-13 VITALS
DIASTOLIC BLOOD PRESSURE: 68 MMHG | BODY MASS INDEX: 51.83 KG/M2 | HEART RATE: 79 BPM | WEIGHT: 264 LBS | TEMPERATURE: 97.8 F | SYSTOLIC BLOOD PRESSURE: 124 MMHG | RESPIRATION RATE: 18 BRPM | OXYGEN SATURATION: 98 % | HEIGHT: 60 IN

## 2022-07-13 DIAGNOSIS — R60.9 DEPENDENT EDEMA: ICD-10-CM

## 2022-07-13 DIAGNOSIS — R73.02 IMPAIRED GLUCOSE TOLERANCE: ICD-10-CM

## 2022-07-13 DIAGNOSIS — G89.29 CHRONIC KNEE PAIN, UNSPECIFIED LATERALITY: ICD-10-CM

## 2022-07-13 DIAGNOSIS — M1A.9XX0 CHRONIC GOUT WITHOUT TOPHUS, UNSPECIFIED CAUSE, UNSPECIFIED SITE: ICD-10-CM

## 2022-07-13 DIAGNOSIS — M54.41 CHRONIC BILATERAL LOW BACK PAIN WITH BILATERAL SCIATICA: ICD-10-CM

## 2022-07-13 DIAGNOSIS — G89.29 CHRONIC BILATERAL LOW BACK PAIN WITH BILATERAL SCIATICA: ICD-10-CM

## 2022-07-13 DIAGNOSIS — E78.5 DYSLIPIDEMIA: ICD-10-CM

## 2022-07-13 DIAGNOSIS — E66.01 MORBID OBESITY (HCC): ICD-10-CM

## 2022-07-13 DIAGNOSIS — M25.569 CHRONIC KNEE PAIN, UNSPECIFIED LATERALITY: ICD-10-CM

## 2022-07-13 DIAGNOSIS — M54.42 CHRONIC BILATERAL LOW BACK PAIN WITH BILATERAL SCIATICA: ICD-10-CM

## 2022-07-13 DIAGNOSIS — I10 ESSENTIAL HYPERTENSION: Primary | ICD-10-CM

## 2022-07-13 PROCEDURE — 1101F PT FALLS ASSESS-DOCD LE1/YR: CPT | Performed by: FAMILY MEDICINE

## 2022-07-13 PROCEDURE — G8536 NO DOC ELDER MAL SCRN: HCPCS | Performed by: FAMILY MEDICINE

## 2022-07-13 PROCEDURE — G8752 SYS BP LESS 140: HCPCS | Performed by: FAMILY MEDICINE

## 2022-07-13 PROCEDURE — G8399 PT W/DXA RESULTS DOCUMENT: HCPCS | Performed by: FAMILY MEDICINE

## 2022-07-13 PROCEDURE — G8510 SCR DEP NEG, NO PLAN REQD: HCPCS | Performed by: FAMILY MEDICINE

## 2022-07-13 PROCEDURE — 1090F PRES/ABSN URINE INCON ASSESS: CPT | Performed by: FAMILY MEDICINE

## 2022-07-13 PROCEDURE — G8417 CALC BMI ABV UP PARAM F/U: HCPCS | Performed by: FAMILY MEDICINE

## 2022-07-13 PROCEDURE — G8754 DIAS BP LESS 90: HCPCS | Performed by: FAMILY MEDICINE

## 2022-07-13 PROCEDURE — 3017F COLORECTAL CA SCREEN DOC REV: CPT | Performed by: FAMILY MEDICINE

## 2022-07-13 PROCEDURE — 99214 OFFICE O/P EST MOD 30 MIN: CPT | Performed by: FAMILY MEDICINE

## 2022-07-13 PROCEDURE — G8427 DOCREV CUR MEDS BY ELIG CLIN: HCPCS | Performed by: FAMILY MEDICINE

## 2022-07-13 PROCEDURE — 1123F ACP DISCUSS/DSCN MKR DOCD: CPT | Performed by: FAMILY MEDICINE

## 2022-07-13 NOTE — PROGRESS NOTES
1. \"Have you been to the ER, urgent care clinic since your last visit? Hospitalized since your last visit? No     2. \"Have you seen or consulted any other health care providers outside of the [address] [place] [address] since your last visit? \" no     3. For patients aged 39-70: Has the patient had a colonoscopy / FIT/ Cologuard? 2018    If the patient is female:    4. For patients aged 41-77: Has the patient had a mammogram within the past 2 years? Has not completed in years       5. For patients aged 21-65: Has the patient had a pap smear?    Gita Buck is a 67 y.o. female is coming in for with the following:     Chief Complaint   Patient presents with    Medication Evaluation          With the following vitals:    Visit Vitals  /68 (BP 1 Location: Right arm, BP Patient Position: Sitting, BP Cuff Size: Adult)   Pulse 79   Temp 97.8 °F (36.6 °C) (Temporal)   Resp 18   Ht 5' (1.524 m)   Wt 264 lb (119.7 kg)   SpO2 98%   BMI 51.56 kg/m²

## 2022-07-13 NOTE — PATIENT INSTRUCTIONS
My Healthy Lifestyle Plan:    Mindset  Remember this is like a part time job so your motivation and commitment is key to your success but perfection is not. There is no single best way to achieve your best health. Trial and success will show us. Start somewhere, keep going, reflect, adjust and keep going. Start again as many times as you need. Think kindly about self, speak highly of yourself, give yourself rob  Think hard about what your motivates you to lose weight then meditate on that. Remind yourself of your motivation often with phone alarms, scheduled meditation time, vision board, journal- just to name a few ideas. Have realistic goals. We expect with diligent healthy diet and physical activity you can lose 5% of your body weight in 3 months. Wt in lbs x 0.05 = #lbs you should lose in 3 months. Make food and activity changes with a goal of CONSISTENCY not perfection. Food  Start eating differently. Most of your weight loss and gain is from what you eat. Use small plates only  Drink 2 liters (1/2 gallon) of water every day  HALF of every meal should be fruit or vegetables  Try meal prepping on Sunday (or your day off) with new different vegetables. Consider meal prep service such as Cleaneatz.com, wepremeals. com  Replace soda with diet soda or other zero sugar drinks (selter water just fine)  Consider using the CrowdBouncer kelly for calorie counting. Activity  Staying physically active will help you lose more weight and can help you get over the plateau when you weight just won't change any more with diet. Start exercise at least 5 days per week for 40 minutes. Consider BlossomandTwigs.com training kelly for home exercises. You can start with walking. I suggest walking at a speed of at least 3.5-4.5mph to for the weight loss benefit. Increase your speed or distance every 2 weeks. Do some slow stretching daily of legs, arms and back. Consider adding weight training with light weights at home or at the gym. See a doctor or a physical training for instructions in order to avoid injuries from doing muscle training incorrectly. Your Personal Goals:      _________________________________________    __________________________________________      Troubleshoot:  Call or mychart message me if you have any issues or feel like quitting. If you feel hungry, EAT! Eat something like like a piece of fruit or veggies and dip. If you feel lightheaded, shaky, headaches, vomiting, abdominal pain, acid reflux, or any other new symptoms then call your doctor's office. Consider talking to a counselor for stress management or evaluation for abnormal eating pattern. Helpful Apps/Links:   Nutrition. gov  Myfitnesspal  Eatthismuch  Weight Watchers  Stronger U             A Healthy Lifestyle: Care Instructions  Your Care Instructions     A healthy lifestyle can help you feel good, stay at a healthy weight, and have plenty of energy for both work and play. A healthy lifestyle is something you can share with your whole family. A healthy lifestyle also can lower your risk for serious health problems, such as high blood pressure, heart disease, and diabetes. You can follow a few steps listed below to improve your health and the health of your family. Follow-up care is a key part of your treatment and safety. Be sure to make and go to all appointments, and call your doctor if you are having problems. It's also a good idea to know your test results and keep a list of the medicines you take. How can you care for yourself at home? · Do not eat too much sugar, fat, or fast foods. You can still have dessert and treats now and then. The goal is moderation. · Start small to improve your eating habits. Pay attention to portion sizes, drink less juice and soda pop, and eat more fruits and vegetables. ? Eat a healthy amount of food. A 3-ounce serving of meat, for example, is about the size of a deck of cards.  Fill the rest of your plate with vegetables and whole grains. ? Limit the amount of soda and sports drinks you have every day. Drink more water when you are thirsty. ? Eat plenty of fruits and vegetables every day. Have an apple or some carrot sticks as an afternoon snack instead of a candy bar. Try to have fruits and/or vegetables at every meal.  · Make exercise part of your daily routine. You may want to start with simple activities, such as walking, bicycling, or slow swimming. Try to be active 30 to 60 minutes every day. You do not need to do all 30 to 60 minutes all at once. For example, you can exercise 3 times a day for 10 or 20 minutes. Moderate exercise is safe for most people, but it is always a good idea to talk to your doctor before starting an exercise program.  · Keep moving. Rahul Sherice the lawn, work in the garden, or [employer]. Take the stairs instead of the elevator at work. · If you smoke, quit. People who smoke have an increased risk for heart attack, stroke, cancer, and other lung illnesses. Quitting is hard, but there are ways to boost your chance of quitting tobacco for good. ? Use nicotine gum, patches, or lozenges. ? Ask your doctor about stop-smoking programs and medicines. ? Keep trying. In addition to reducing your risk of diseases in the future, you will notice some benefits soon after you stop using tobacco. If you have shortness of breath or asthma symptoms, they will likely get better within a few weeks after you quit. · Limit how much alcohol you drink. Moderate amounts of alcohol (up to 2 drinks a day for men, 1 drink a day for women) are okay. But drinking too much can lead to liver problems, high blood pressure, and other health problems. Family health  If you have a family, there are many things you can do together to improve your health. · Eat meals together as a family as often as possible. · Eat healthy foods. This includes fruits, vegetables, lean meats and dairy, and whole grains.   · Include your family in your fitness plan. Most people think of activities such as jogging or tennis as the way to fitness, but there are many ways you and your family can be more active. Anything that makes you breathe hard and gets your heart pumping is exercise. Here are some tips:  ? Walk to do errands or to take your child to school or the bus.  ? Go for a family bike ride after dinner instead of watching TV. Where can you learn more? Go to http://www.gray.com/  Enter N967 in the search box to learn more about \"A Healthy Lifestyle: Care Instructions. \"  Current as of: June 16, 2021               Content Version: 13.2  © 2006-2022 Healthwise, Incorporated. Care instructions adapted under license by [employer] (which disclaims liability or warranty for this information). If you have questions about a medical condition or this instruction, always ask your healthcare professional. [address] any warranty or liability for your use of this information.

## 2022-07-13 NOTE — PROGRESS NOTES
Family Medicine Clinic Note    Assessment/ Plan:   Diagnoses and all orders for this visit:    1. Essential hypertension  -     METABOLIC PANEL, COMPREHENSIVE  -     LIPID PANEL  -     CBC WITH AUTOMATED DIFF    2. Dyslipidemia  -     LIPID PANEL    3. Impaired glucose tolerance  -     HEMOGLOBIN A1C WITH EAG    4. Dependent edema    5. Chronic bilateral low back pain with bilateral sciatica    6. Chronic knee pain, unspecified laterality    7. Chronic gout without tophus, unspecified cause, unspecified site  -     URIC ACID    8. Morbid obesity ([address]        Hypertension, well controlled. Continue current regimen. Reviewed with patient in detail that hydrochlorothiazide may exacerbate gout. Patient expresses understanding but would like to continue her valsartan-hydrochlorothiazide as this is controlling her symptoms and she has not had symptoms of gout with addition of allopurinol. We will continue current regimen, no refills are even needed at this time. We will check labs to evaluate endorgan function including uric acid to monitor. Hyperlipidemia, uncontrolled with last LDL greater than 100. Labs to monitor. Continue addition of atorvastatin if ASCVD risk remains greater than 7%. Chronic pains requiring tramadol, etiology is multifactorial given history of gout, knee pain not improved with knee replacement, charted history of bilateral low back pain with bilateral sciatica, severe edema bilateral lower extremities, wheelchair-bound. No refill needed at this time. With obesity, severe. Noted complicated by prediabetes, hypertension, hyperlipidemia, chronic edema, osteoarthritis. Patient has poor insight/judgment related to obesity. Patient is in precontemplative stage of behavioral change. I respected patient's decision not to discuss weight loss at this time. Educated patient on red flag symptoms to warrant return to clinic or emergency room visit.     I have discussed the diagnosis with the patient and the intended plan as seen in the above orders. The patient has been offered or received an after-visit summary and questions were answered concerning future plans. I have discussed medication side effects and warnings with the patient as well. Follow-up and Dispositions    · Return in about 3 months (around 10/13/2022) for Follow Up chronic conditions with PCP. Subjective:     Chief Complaint   Patient presents with    Medication Evaluation     Tahira Staley is a 67y.o. year old female who presents for evaluation of the following:      She states her PCP has recently received refill sof her medicaitons, - all but spironolactone    Hypertension/Hyperlipidemia  Diet: Not adhering to strict low salt diet  Endorses chronic extremity edema  Denies palpitations, chest pain, shortness of breath,  The 10-year ASCVD risk score (Franny Restrepo., et al., 2013) is: 12.6%  Key CAD CHF Meds             valsartan-hydroCHLOROthiazide (DIOVAN-HCT) 320-25 mg per tablet (Taking) TAKE 1 TABLET BY MOUTH EVERY DAY    furosemide (LASIX) 20 mg tablet (Taking) TAKE 1 TABLET BY MOUTH TWICE DAILY    verapamil ER (VERELAN) 240 mg ER capsule (Taking) TAKE 1 CAPSULE BY MOUTH DAILY    verapamil ER (VERELAN) 240 mg ER capsule (Taking) TAKE 1 CAPSULE BY MOUTH DAILY    aspirin delayed-release 81 mg tablet (Taking) aspirin 81 mg tablet,delayed release   TAKE 1 TABLET BY MOUTH DAILY WITH A MEAL        Chronic Pain  Knees, gout, back, edema  Wheelchair bouend  No gout flare recently  Treatment: tramadol, allopurinol    Edema, dependent  Chrnic  Treatment: furosemide, HCTZ  No recent changes    Review of Systems   Pertinent positives and negative per HPI. All other systems  reviewed are negative for a Comprehensive ROS (10+). Past medical history, social history, family history reviewed. Medications reconciled.        Objective:     Vitals:    07/13/22 1027   BP: 124/68   Pulse: 79   Resp: 18   Temp: 97.8 °F (36.6 °C)   TempSrc: Temporal   SpO2: 98%   Weight: 264 lb (119.7 kg)   Height: 5' (1.524 m)       Physical Examination:  General: Alert, cooperative, no distress, appears stated age. Morbid obesity  Eyes: Conjunctivae clear. Pupils equally round and reactive to light, Extraocular muscles intact. Ears: Normal external ear canals both ears. Nose: Nares normal. Septum midline. Mucosa normal. No drainage or sinus tenderness. Mouth/Throat: Lips, mucosa, and tongue normal. No oropharyngeal erythema. No tonsillar enlargement or exudate. Neck: Supple, symmetrical, trachea midline, no adenopathy. No thyroid enlargement/tenderness/nodules  Respiratory: Breathing comfortably, in no acute respiratory distress. Clear to auscultation bilaterally. Normal inspiratory and expiratory ratio. Cardiovascular: Regular rate and rhythm, S1, S2 normal, no murmur, click, rub or gallop.   -Extremities with 1+ edema bilaterally Pulses 2+ and symmetric radial   -Wearing compression stockings  Abdomen: Soft, not distended. Bowel sounds normal. Non tender. MSK: In wheelchaire  Skin: Skin color, texture, turgor normal. No rashes or lesions on exposed skin. Lymph nodes: Cervical, supraclavicular nodes normal.  Neurologic: Cranial nerves II-XII intact. Psychiatric: Affect appropriate. Voice Recognition Transcription Disclaimer  This note is the work product of voice recognition technology. As a result, there may be spelling, grammar, punctuation errors, or unwanted word substitutions which may alter significantly the meaning of portions or all of this document. Sometimes notes are changed as errors are identified in order to catch these errors and make corrections in a timely fashion. Even though the notes are signed off, there may still be unrecognized errors as a result of this process.  If, during the course of reviewing this document, you find errors or have questions, please do not hesitate to contact me for clarification.       Signed,    Renee Farah MD  7/13/2022

## 2022-07-15 RX ORDER — ALLOPURINOL 100 MG/1
100 TABLET ORAL DAILY
Qty: 90 TABLET | Refills: 1 | Status: CANCELLED | OUTPATIENT
Start: 2022-07-15

## 2022-08-15 ENCOUNTER — TELEPHONE (OUTPATIENT)
Dept: FAMILY MEDICINE CLINIC | Age: 72
End: 2022-08-15

## 2022-08-15 NOTE — TELEPHONE ENCOUNTER
----- Message from Blaise Montero sent at 8/9/2022  4:23 PM EDT -----  Subject: Message to Provider    QUESTIONS  Information for Provider? Patient stated that she has a sore throat and   its kind of itchy. Patient is requesting another prescription of Keflex. Patient denies fever. Please contact the patient.   ---------------------------------------------------------------------------  --------------  Tanya SHEEHAN  [phone]; OK to leave message on voicemail  ---------------------------------------------------------------------------  --------------  SCRIPT ANSWERS  Relationship to Patient?  Self

## 2022-08-15 NOTE — TELEPHONE ENCOUNTER
Our records show patient was last given that medication in FEB of this year by another provider. Must schedule an appointment to be evaluated appropriately for the sore throat before medication is prescribed.   Please call patient and notify

## 2022-09-27 DIAGNOSIS — M1A.9XX0 CHRONIC GOUT WITHOUT TOPHUS, UNSPECIFIED CAUSE, UNSPECIFIED SITE: ICD-10-CM

## 2022-09-27 RX ORDER — TRAMADOL HYDROCHLORIDE 50 MG/1
TABLET ORAL
Qty: 120 TABLET | Refills: 5 | Status: SHIPPED | OUTPATIENT
Start: 2022-09-27 | End: 2022-10-27

## 2022-11-22 DIAGNOSIS — I10 ESSENTIAL HYPERTENSION: ICD-10-CM

## 2022-11-23 RX ORDER — VERAPAMIL HYDROCHLORIDE 240 MG/1
CAPSULE, EXTENDED RELEASE ORAL
Qty: 90 CAPSULE | Refills: 1 | Status: SHIPPED | OUTPATIENT
Start: 2022-11-23

## 2022-12-27 DIAGNOSIS — R60.9 EDEMA, UNSPECIFIED TYPE: ICD-10-CM

## 2022-12-27 DIAGNOSIS — M1A.9XX0 CHRONIC GOUT WITHOUT TOPHUS, UNSPECIFIED CAUSE, UNSPECIFIED SITE: ICD-10-CM

## 2022-12-28 RX ORDER — FUROSEMIDE 20 MG/1
TABLET ORAL
Qty: 180 TABLET | Refills: 0 | Status: SHIPPED | OUTPATIENT
Start: 2022-12-28

## 2022-12-28 RX ORDER — ALLOPURINOL 100 MG/1
100 TABLET ORAL DAILY
Qty: 90 TABLET | Refills: 1 | Status: SHIPPED | OUTPATIENT
Start: 2022-12-28

## 2023-01-03 ENCOUNTER — OFFICE VISIT (OUTPATIENT)
Dept: FAMILY MEDICINE CLINIC | Age: 73
End: 2023-01-03
Payer: MEDICARE

## 2023-01-03 VITALS
HEIGHT: 60 IN | WEIGHT: 244.8 LBS | OXYGEN SATURATION: 98 % | DIASTOLIC BLOOD PRESSURE: 71 MMHG | HEART RATE: 74 BPM | SYSTOLIC BLOOD PRESSURE: 129 MMHG | TEMPERATURE: 97.5 F | BODY MASS INDEX: 48.06 KG/M2

## 2023-01-03 DIAGNOSIS — I10 ESSENTIAL HYPERTENSION: ICD-10-CM

## 2023-01-03 DIAGNOSIS — R26.81 UNSTEADY GAIT WHEN WALKING: ICD-10-CM

## 2023-01-03 DIAGNOSIS — E66.01 MORBID OBESITY WITH BMI OF 45.0-49.9, ADULT (HCC): ICD-10-CM

## 2023-01-03 DIAGNOSIS — Z00.00 MEDICARE ANNUAL WELLNESS VISIT, SUBSEQUENT: Primary | ICD-10-CM

## 2023-01-03 DIAGNOSIS — R29.898 WEAKNESS OF BOTH LOWER EXTREMITIES: ICD-10-CM

## 2023-01-03 DIAGNOSIS — Z12.31 ENCOUNTER FOR SCREENING MAMMOGRAM FOR MALIGNANT NEOPLASM OF BREAST: ICD-10-CM

## 2023-01-03 DIAGNOSIS — Z12.11 ENCOUNTER FOR SCREENING FOR MALIGNANT NEOPLASM OF COLON: ICD-10-CM

## 2023-01-03 PROCEDURE — 1123F ACP DISCUSS/DSCN MKR DOCD: CPT | Performed by: NURSE PRACTITIONER

## 2023-01-03 PROCEDURE — G0439 PPPS, SUBSEQ VISIT: HCPCS | Performed by: NURSE PRACTITIONER

## 2023-01-03 PROCEDURE — 99213 OFFICE O/P EST LOW 20 MIN: CPT | Performed by: NURSE PRACTITIONER

## 2023-01-03 PROCEDURE — G8536 NO DOC ELDER MAL SCRN: HCPCS | Performed by: NURSE PRACTITIONER

## 2023-01-03 PROCEDURE — G9899 SCRN MAM PERF RSLTS DOC: HCPCS | Performed by: NURSE PRACTITIONER

## 2023-01-03 PROCEDURE — G8427 DOCREV CUR MEDS BY ELIG CLIN: HCPCS | Performed by: NURSE PRACTITIONER

## 2023-01-03 PROCEDURE — 3078F DIAST BP <80 MM HG: CPT | Performed by: NURSE PRACTITIONER

## 2023-01-03 PROCEDURE — 1101F PT FALLS ASSESS-DOCD LE1/YR: CPT | Performed by: NURSE PRACTITIONER

## 2023-01-03 PROCEDURE — 3074F SYST BP LT 130 MM HG: CPT | Performed by: NURSE PRACTITIONER

## 2023-01-03 PROCEDURE — 1090F PRES/ABSN URINE INCON ASSESS: CPT | Performed by: NURSE PRACTITIONER

## 2023-01-03 PROCEDURE — G8432 DEP SCR NOT DOC, RNG: HCPCS | Performed by: NURSE PRACTITIONER

## 2023-01-03 PROCEDURE — 3017F COLORECTAL CA SCREEN DOC REV: CPT | Performed by: NURSE PRACTITIONER

## 2023-01-03 PROCEDURE — G8399 PT W/DXA RESULTS DOCUMENT: HCPCS | Performed by: NURSE PRACTITIONER

## 2023-01-03 PROCEDURE — G8417 CALC BMI ABV UP PARAM F/U: HCPCS | Performed by: NURSE PRACTITIONER

## 2023-01-03 RX ORDER — TRAMADOL HYDROCHLORIDE 50 MG/1
TABLET ORAL
COMMUNITY
Start: 2022-12-27

## 2023-01-03 NOTE — PROGRESS NOTES
Mani Szymanski (: 1950) is a 67 y.o. female, established patient, here for evaluation of the following chief complaint(s):  Hypertension, Cholesterol Problem, and Labs      SUBJECTIVE/OBJECTIVE:  HPI  Patient is a 67 y.o. F Patient presenting for hypertension followup, annual exam, hyperlipidemia check. unable to walk with a cane since stopping pool exercises, used to do 2 hours daily. Now highly dependent on walker Rollator to ambulate along with wheelchair bound when long distance. Reports extremely fatigue and lower extremity weakness. Does report at home when she tries stretching exercises and lower extremity exercises it does help somewhat to improve her strength and mobility. Patient reports blood pressures at home most frequently normal. Patient has symptoms of none of the following; no chest pain, shortness of breath, weakness, orthostatic hypotension, cough, myalgias, rash, headaches, weight gain, leg swelling, palpitations, slow heart rate, fatigue, depression. Patient reports good compliance with medications, no side effects from medications noted. Current treatments include diet modification, weight loss. Review of Systems  All other systems reviewed and are negative. Visit Vitals  /71 (BP 1 Location: Right upper arm, BP Patient Position: Sitting, BP Cuff Size: Large adult)   Pulse 74   Temp 97.5 °F (36.4 °C) (Temporal)   Ht 5' (1.524 m)   Wt 244 lb 12.8 oz (111 kg)   LMP  (LMP Unknown)   SpO2 98%   BMI 47.81 kg/m²       Physical Exam  Constitutional:       Appearance: She is obese. Neurological:      General: No focal deficit present. Motor: Weakness present. Gait: Gait abnormal.      Comments: Patient is wheelchair-bound   Constitutional:  No acute distress  HEENT:  Head normocephalic and atraumatic. CV:  Regular rate and rhythm. No murmur. Respiratory:  Lungs clear to auscultation bilaterally  Abdomen:  Soft, non-tender. Skin:  Normal color.  Warm and Dry  Extremities:  Non-tender. No pedal edema. Back:  No tenderness        This is the Subsequent Medicare Annual Wellness Exam, performed 12 months or more after the Initial AWV or the last Subsequent AWV    I have reviewed the patient's medical history in detail and updated the computerized patient record. Assessment/Plan   Education and counseling provided:  Are appropriate based on today's review and evaluation  Pneumococcal Vaccine  Influenza Vaccine  Hepatitis B Vaccine  Screening Mammography  Screening Pap and pelvic (covered once every 2 years)  Colorectal cancer screening tests  Cardiovascular screening blood test  Bone mass measurement (DEXA)  Screening for glaucoma  Diabetes screening test    1. Medicare annual wellness visit, subsequent  2. Encounter for screening mammogram for malignant neoplasm of breast  -     MIAN MAMMO BI SCREENING INCL CAD; Future  3. Morbid obesity with BMI of 45.0-49.9, adult ([facility] [address]  4. Encounter for screening for malignant neoplasm of colon  -     COLOGUARD TEST (FECAL DNA COLORECTAL CANCER SCREENING)  5. Unsteady gait when walking  -     REFERRAL TO PHYSICAL THERAPY  -     REFERRAL TO OCCUPATIONAL THERAPY  6. Weakness of both lower extremities  -     REFERRAL TO PHYSICAL THERAPY  -     REFERRAL TO OCCUPATIONAL THERAPY  7. Essential hypertension  Due to decompensation and generalized weakness patient has been referred to occupational therapy and physical therapy to help improve overall strength and mobility  Issues discussed include maintaining a diet, weight control and exercise, continued home blood pressure monitoring, maintaining medication compliance, medication side effects, long term hypertension complications, and refraining from tobacco use.     Treatment plan: Continue current medications, diet modification, maintaining low sodium diet, blood pressure goal discussed, labwork from today discussed and reviewed, future labwork ordered, A1C, lipids check, patient up to date on maintence and screening exams. Depression Risk Factor Screening     3 most recent PHQ Screens 1/3/2023   Little interest or pleasure in doing things Not at all   Feeling down, depressed, irritable, or hopeless Not at all   Total Score PHQ 2 0       Alcohol & Drug Abuse Risk Screen    Do you average more than 1 drink per night or more than 7 drinks a week: No    In the past three months have you have had more than 4 drinks containing alcohol on one occasion: No       Opioid Risk: (Low risk score <55, High risk score ?55)  Opioid risk score: 10      Click here to complete the Controlled Substance Monitoring SmartForm    Last PDMP Evans as Reviewed:  Review User Review Instant Review Result   Karon Lr 7/59/2563 00:35 AM Reviewed PDMP [1]     Functional Ability and Level of Safety    Hearing: Hearing is good. Activities of Daily Living: The home contains: handrails and grab bars  Patient needs help with:  transportation, shopping, preparing meals, housework, bathing, hygiene, and walking      Ambulation: with difficulty, uses a cane and walker     Fall Risk:  Fall Risk Assessment, last 12 mths 1/3/2023   Able to walk? No   Fall in past 12 months? -   Do you feel unsteady? -   Are you worried about falling -   Is TUG test greater than 12 seconds?  -   Is the gait abnormal? -   Number of falls in past 12 months -      Abuse Screen:  Patient is not abused       Cognitive Screening    Has your family/caregiver stated any concerns about your memory: no     Cognitive Screening: Normal - MMSE (Mini Mental Status Exam), Clock Drawing Test    Health Maintenance Due     Health Maintenance Due   Topic Date Due    Shingles Vaccine (1 of 2) Never done    Pneumococcal 65+ years (1 - PCV) Never done    Breast Cancer Screen Mammogram  03/26/2021    COVID-19 Vaccine (3 - Booster for Moderna series) 05/11/2021    Colorectal Cancer Screening Combo  09/19/2021    Flu Vaccine (1) 08/01/2022       Patient Care Team Patient Care Team:  Lilla Dance, NP as PCP - General (Nurse Practitioner)  Lilla Dance, NP as PCP - REHABILITATION [facility] Empaneled Provider    History     Patient Active Problem List   Diagnosis Code    Localized edema due to fluid overload E87.70    Essential hypertension I10    Gout M10.9    Knee pain M25.569    Chronic neck and back pain M54.2, M54.9, G89.29    Morbid obesity (HCC) E66.01    Chronic gouty arthritis M1A. 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    Impaired glucose tolerance R73.02    Lumbago with sciatica M54.40    Pain in right arm M79.601    Spondylolisthesis M43.10    Visual impairment H54.7    Lymphedema I89.0     Past Medical History:   Diagnosis Date    Chronic neck and back pain     Essential hypertension 2020    Gout 2020    Hyperlipidemia 2020    Knee pain 2020    Localized edema due to fluid overload 2020      Past Surgical History:   Procedure Laterality Date    HX GYN          HX GYN           HX ORTHOPAEDIC Left     complete rotator cuff repair x 2     HX ORTHOPAEDIC Right     total knee replacement    HX OTHER SURGICAL      B/L lower extremity - insufficient venous supply procedures      Current Outpatient Medications   Medication Sig Dispense Refill    traMADoL (ULTRAM) 50 mg tablet       furosemide (LASIX) 20 mg tablet TAKE 1 TABLET BY MOUTH TWICE DAILY 180 Tablet 0    allopurinoL (ZYLOPRIM) 100 mg tablet TAKE 1 TABLET BY MOUTH DAILY 90 Tablet 1    valsartan-hydroCHLOROthiazide (DIOVAN-HCT) 320-25 mg per tablet TAKE 1 TABLET BY MOUTH EVERY DAY 90 Tablet 1    verapamil ER (VERELAN) 240 mg ER capsule TAKE 1 CAPSULE BY MOUTH DAILY 90 Capsule 1    aspirin delayed-release 81 mg tablet aspirin 81 mg tablet,delayed release   TAKE 1 TABLET BY MOUTH DAILY WITH A MEAL       Allergies Allergen Reactions    Gabapentin Other (comments)       Family History   Problem Relation Age of Onset    No Known Problems Mother     No Known Problems Father      Social History     Tobacco Use    Smoking status: Never    Smokeless tobacco: Never   Substance Use Topics    Alcohol use: Not Currently       Aspects of this note may have been generated using voice recognition software. Despite editing, there may be some syntax errors. An electronic signature was used to authenticate this note.   -- Pk Gary NP

## 2023-01-03 NOTE — PROGRESS NOTES
1. Have you been to the ER, urgent care clinic since your last visit? Hospitalized since your last visit? No    2. Have you seen or consulted any other health care providers outside of the [address] [place] [address] since your last visit? Include any pap smears or colon screening.  No      Chief Complaint   Patient presents with    Hypertension    Cholesterol Problem    Labs     Visit Vitals  /71 (BP 1 Location: Right upper arm, BP Patient Position: Sitting, BP Cuff Size: Large adult)   Pulse 74   Temp 97.5 °F (36.4 °C) (Temporal)   Ht 5' (1.524 m)   Wt 244 lb 12.8 oz (111 kg)   LMP  (LMP Unknown)   SpO2 98%   BMI 47.81 kg/m²

## 2023-02-13 DIAGNOSIS — M1A.9XX0 CHRONIC GOUT WITHOUT TOPHUS, UNSPECIFIED CAUSE, UNSPECIFIED SITE: ICD-10-CM

## 2023-02-14 RX ORDER — ALLOPURINOL 100 MG/1
100 TABLET ORAL DAILY
Qty: 90 TABLET | Refills: 1 | Status: SHIPPED | OUTPATIENT
Start: 2023-02-14

## 2023-03-02 RX ORDER — VALSARTAN AND HYDROCHLOROTHIAZIDE 320; 25 MG/1; MG/1
TABLET, FILM COATED ORAL
Qty: 90 TABLET | Refills: 1 | Status: SHIPPED | OUTPATIENT
Start: 2023-03-02

## 2023-03-27 DIAGNOSIS — M1A.9XX0 CHRONIC GOUT WITHOUT TOPHUS, UNSPECIFIED CAUSE, UNSPECIFIED SITE: Primary | ICD-10-CM

## 2023-03-27 RX ORDER — TRAMADOL HYDROCHLORIDE 50 MG/1
TABLET ORAL
Qty: 120 TABLET | Refills: 5 | Status: SHIPPED | OUTPATIENT
Start: 2023-03-27 | End: 2023-04-26

## 2023-04-01 LAB
ALBUMIN SERPL-MCNC: 3.8 G/DL (ref 3.7–4.7)
ALBUMIN/GLOB SERPL: 1 {RATIO} (ref 1.2–2.2)
ALP SERPL-CCNC: 75 IU/L (ref 44–121)
ALT SERPL-CCNC: 12 IU/L (ref 0–32)
AST SERPL-CCNC: 13 IU/L (ref 0–40)
BASOPHILS # BLD AUTO: 0 X10E3/UL (ref 0–0.2)
BASOPHILS NFR BLD AUTO: 1 %
BILIRUB SERPL-MCNC: 0.4 MG/DL (ref 0–1.2)
BUN SERPL-MCNC: 29 MG/DL (ref 8–27)
BUN/CREAT SERPL: 27 (ref 12–28)
CALCIUM SERPL-MCNC: 11.5 MG/DL (ref 8.7–10.3)
CHLORIDE SERPL-SCNC: 98 MMOL/L (ref 96–106)
CHOLEST SERPL-MCNC: 162 MG/DL (ref 100–199)
CO2 SERPL-SCNC: 26 MMOL/L (ref 20–29)
CREAT SERPL-MCNC: 1.06 MG/DL (ref 0.57–1)
EGFRCR SERPLBLD CKD-EPI 2021: 56 ML/MIN/1.73
EOSINOPHIL # BLD AUTO: 0.2 X10E3/UL (ref 0–0.4)
EOSINOPHIL NFR BLD AUTO: 3 %
ERYTHROCYTE [DISTWIDTH] IN BLOOD BY AUTOMATED COUNT: 14.9 % (ref 11.7–15.4)
EST. AVERAGE GLUCOSE BLD GHB EST-MCNC: 120 MG/DL
GLOBULIN SER CALC-MCNC: 3.8 G/DL (ref 1.5–4.5)
GLUCOSE SERPL-MCNC: 95 MG/DL (ref 70–99)
HBA1C MFR BLD: 5.8 % (ref 4.8–5.6)
HCT VFR BLD AUTO: 32.1 % (ref 34–46.6)
HDLC SERPL-MCNC: 71 MG/DL
HGB BLD-MCNC: 10.6 G/DL (ref 11.1–15.9)
IMM GRANULOCYTES # BLD AUTO: 0 X10E3/UL (ref 0–0.1)
IMM GRANULOCYTES NFR BLD AUTO: 0 %
LDLC SERPL CALC-MCNC: 82 MG/DL (ref 0–99)
LYMPHOCYTES # BLD AUTO: 1.8 X10E3/UL (ref 0.7–3.1)
LYMPHOCYTES NFR BLD AUTO: 29 %
MCH RBC QN AUTO: 28.6 PG (ref 26.6–33)
MCHC RBC AUTO-ENTMCNC: 33 G/DL (ref 31.5–35.7)
MCV RBC AUTO: 87 FL (ref 79–97)
MONOCYTES # BLD AUTO: 0.7 X10E3/UL (ref 0.1–0.9)
MONOCYTES NFR BLD AUTO: 11 %
NEUTROPHILS # BLD AUTO: 3.5 X10E3/UL (ref 1.4–7)
NEUTROPHILS NFR BLD AUTO: 56 %
PLATELET # BLD AUTO: 259 X10E3/UL (ref 150–450)
POTASSIUM SERPL-SCNC: 4.2 MMOL/L (ref 3.5–5.2)
PROT SERPL-MCNC: 7.6 G/DL (ref 6–8.5)
RBC # BLD AUTO: 3.7 X10E6/UL (ref 3.77–5.28)
SODIUM SERPL-SCNC: 137 MMOL/L (ref 134–144)
TRIGL SERPL-MCNC: 42 MG/DL (ref 0–149)
URATE SERPL-MCNC: 9 MG/DL (ref 3.1–7.9)
VLDLC SERPL CALC-MCNC: 9 MG/DL (ref 5–40)
WBC # BLD AUTO: 6.2 X10E3/UL (ref 3.4–10.8)

## 2023-04-25 DIAGNOSIS — M1A.9XX0 CHRONIC GOUT WITHOUT TOPHUS, UNSPECIFIED CAUSE, UNSPECIFIED SITE: ICD-10-CM

## 2023-04-28 RX ORDER — TRAMADOL HYDROCHLORIDE 50 MG/1
TABLET ORAL
Qty: 120 TABLET | Refills: 2 | Status: SHIPPED | OUTPATIENT
Start: 2023-04-28 | End: 2023-05-28

## 2023-06-23 ENCOUNTER — OFFICE VISIT (OUTPATIENT)
Facility: CLINIC | Age: 73
End: 2023-06-23

## 2023-06-23 VITALS
DIASTOLIC BLOOD PRESSURE: 80 MMHG | HEART RATE: 84 BPM | BODY MASS INDEX: 56.44 KG/M2 | WEIGHT: 289 LBS | TEMPERATURE: 97.5 F | SYSTOLIC BLOOD PRESSURE: 138 MMHG | OXYGEN SATURATION: 97 %

## 2023-06-23 DIAGNOSIS — E11.9 TYPE 2 DIABETES MELLITUS WITHOUT COMPLICATION, WITH LONG-TERM CURRENT USE OF INSULIN (HCC): Primary | ICD-10-CM

## 2023-06-23 DIAGNOSIS — I10 ESSENTIAL (PRIMARY) HYPERTENSION: ICD-10-CM

## 2023-06-23 DIAGNOSIS — Z79.4 TYPE 2 DIABETES MELLITUS WITHOUT COMPLICATION, WITH LONG-TERM CURRENT USE OF INSULIN (HCC): Primary | ICD-10-CM

## 2023-06-23 DIAGNOSIS — Z12.31 ENCOUNTER FOR SCREENING MAMMOGRAM FOR BREAST CANCER: ICD-10-CM

## 2023-06-23 DIAGNOSIS — E78.5 HYPERLIPIDEMIA, UNSPECIFIED HYPERLIPIDEMIA TYPE: ICD-10-CM

## 2023-06-23 DIAGNOSIS — L03.116 CELLULITIS OF LEFT LOWER EXTREMITY: ICD-10-CM

## 2023-06-23 PROCEDURE — 3074F SYST BP LT 130 MM HG: CPT | Performed by: NURSE PRACTITIONER

## 2023-06-23 PROCEDURE — 1123F ACP DISCUSS/DSCN MKR DOCD: CPT | Performed by: NURSE PRACTITIONER

## 2023-06-23 PROCEDURE — 3044F HG A1C LEVEL LT 7.0%: CPT | Performed by: NURSE PRACTITIONER

## 2023-06-23 PROCEDURE — 99214 OFFICE O/P EST MOD 30 MIN: CPT | Performed by: NURSE PRACTITIONER

## 2023-06-23 PROCEDURE — 3078F DIAST BP <80 MM HG: CPT | Performed by: NURSE PRACTITIONER

## 2023-06-23 RX ORDER — CEPHALEXIN 500 MG/1
500 CAPSULE ORAL 4 TIMES DAILY
Qty: 40 CAPSULE | Refills: 0 | Status: SHIPPED | OUTPATIENT
Start: 2023-06-23

## 2023-06-23 RX ORDER — SEMAGLUTIDE 0.68 MG/ML
INJECTION, SOLUTION SUBCUTANEOUS
Qty: 3 ML | Refills: 2 | Status: SHIPPED | OUTPATIENT
Start: 2023-06-23

## 2023-06-23 RX ORDER — SEMAGLUTIDE 1.34 MG/ML
1 INJECTION, SOLUTION SUBCUTANEOUS WEEKLY
Qty: 9 ML | Refills: 1 | Status: SHIPPED | OUTPATIENT
Start: 2023-06-23

## 2023-07-21 LAB
ALBUMIN SERPL-MCNC: 3.6 G/DL (ref 3.8–4.8)
ALBUMIN/GLOB SERPL: 0.9 {RATIO} (ref 1.2–2.2)
ALP SERPL-CCNC: 68 IU/L (ref 44–121)
ALT SERPL-CCNC: 11 IU/L (ref 0–32)
AST SERPL-CCNC: 17 IU/L (ref 0–40)
BASOPHILS # BLD AUTO: 0 X10E3/UL (ref 0–0.2)
BASOPHILS NFR BLD AUTO: 1 %
BILIRUB SERPL-MCNC: 0.4 MG/DL (ref 0–1.2)
BUN SERPL-MCNC: 28 MG/DL (ref 8–27)
BUN/CREAT SERPL: 29 (ref 12–28)
CALCIUM SERPL-MCNC: 11.4 MG/DL (ref 8.7–10.3)
CHLORIDE SERPL-SCNC: 96 MMOL/L (ref 96–106)
CHOLEST SERPL-MCNC: 170 MG/DL (ref 100–199)
CO2 SERPL-SCNC: 26 MMOL/L (ref 20–29)
CREAT SERPL-MCNC: 0.96 MG/DL (ref 0.57–1)
EGFRCR SERPLBLD CKD-EPI 2021: 62 ML/MIN/1.73
EOSINOPHIL # BLD AUTO: 0.2 X10E3/UL (ref 0–0.4)
EOSINOPHIL NFR BLD AUTO: 3 %
ERYTHROCYTE [DISTWIDTH] IN BLOOD BY AUTOMATED COUNT: 16.1 % (ref 11.7–15.4)
GLOBULIN SER CALC-MCNC: 3.9 G/DL (ref 1.5–4.5)
GLUCOSE SERPL-MCNC: 77 MG/DL (ref 70–99)
HCT VFR BLD AUTO: 33.2 % (ref 34–46.6)
HDLC SERPL-MCNC: 66 MG/DL
HGB BLD-MCNC: 10.9 G/DL (ref 11.1–15.9)
IMM GRANULOCYTES # BLD AUTO: 0 X10E3/UL (ref 0–0.1)
IMM GRANULOCYTES NFR BLD AUTO: 0 %
LDLC SERPL CALC-MCNC: 94 MG/DL (ref 0–99)
LYMPHOCYTES # BLD AUTO: 1.7 X10E3/UL (ref 0.7–3.1)
LYMPHOCYTES NFR BLD AUTO: 33 %
MCH RBC QN AUTO: 28.2 PG (ref 26.6–33)
MCHC RBC AUTO-ENTMCNC: 32.8 G/DL (ref 31.5–35.7)
MCV RBC AUTO: 86 FL (ref 79–97)
MONOCYTES # BLD AUTO: 0.7 X10E3/UL (ref 0.1–0.9)
MONOCYTES NFR BLD AUTO: 13 %
NEUTROPHILS # BLD AUTO: 2.6 X10E3/UL (ref 1.4–7)
NEUTROPHILS NFR BLD AUTO: 50 %
PLATELET # BLD AUTO: 218 X10E3/UL (ref 150–450)
POTASSIUM SERPL-SCNC: 4.2 MMOL/L (ref 3.5–5.2)
PROT SERPL-MCNC: 7.5 G/DL (ref 6–8.5)
RBC # BLD AUTO: 3.86 X10E6/UL (ref 3.77–5.28)
SODIUM SERPL-SCNC: 137 MMOL/L (ref 134–144)
TRIGL SERPL-MCNC: 51 MG/DL (ref 0–149)
TSH SERPL DL<=0.005 MIU/L-ACNC: 2.8 UIU/ML (ref 0.45–4.5)
VLDLC SERPL CALC-MCNC: 10 MG/DL (ref 5–40)
WBC # BLD AUTO: 5.2 X10E3/UL (ref 3.4–10.8)

## 2023-07-26 ENCOUNTER — TELEPHONE (OUTPATIENT)
Facility: CLINIC | Age: 73
End: 2023-07-26

## 2023-07-26 NOTE — TELEPHONE ENCOUNTER
----- Message from [address], APRJOSE James CNP sent at 7/25/2023  3:59 PM EDT -----  Results have been reviewed and abnormal results noted. Cholesterol improved from previous, total cholesterol 170 and LDL, bad cholesterol, 94 which is normal.  We will not make any changes to current regimen. Advised to increase exercise to include 30 minutes 5 days/week. And reduce high fat content food such as red meats, fried food, processed food. Anemia has improved as well continue with over-the-counter iron supplementation to manage. We will recheck at follow-up visit.

## 2023-07-28 ENCOUNTER — TELEPHONE (OUTPATIENT)
Facility: CLINIC | Age: 73
End: 2023-07-28

## 2023-07-31 NOTE — TELEPHONE ENCOUNTER
Requested Prescriptions     Pending Prescriptions Disp Refills    verapamil (VERELAN) 240 MG extended release capsule [Pharmacy Med Name: VERAPAMIL 240MG SR CAPS] 90 capsule      Sig: TAKE 1 CAPSULE BY MOUTH DAILY          Last OV:6/23/23  Next OV:10/3/23  Last Refill:1/25/22  Qty 90  Refills 1

## 2023-08-01 RX ORDER — VERAPAMIL HYDROCHLORIDE 240 MG/1
CAPSULE, EXTENDED RELEASE ORAL DAILY
Qty: 90 CAPSULE | Refills: 3 | Status: SHIPPED | OUTPATIENT
Start: 2023-08-01

## 2023-08-30 ENCOUNTER — TELEPHONE (OUTPATIENT)
Facility: CLINIC | Age: 73
End: 2023-08-30

## 2023-08-30 DIAGNOSIS — L03.116 CELLULITIS OF LEFT LOWER EXTREMITY: ICD-10-CM

## 2023-08-30 NOTE — TELEPHONE ENCOUNTER
----- Message from Mae Blankenship MA sent at 8/29/2023  4:05 PM EDT -----  Subject: Message to Provider    QUESTIONS  Information for Provider? Pt states she was prescribed Keflex for the   bumps on her legs, they all went away but one is left. Asking if maybe she   should get a refill because it was only a 14 day supply. ---------------------------------------------------------------------------  --------------  Marly RUSSELL  [phone]; OK to leave message on voicemail  ---------------------------------------------------------------------------  --------------  SCRIPT ANSWERS  Relationship to Patient?  Self

## 2023-08-30 NOTE — TELEPHONE ENCOUNTER
Requested Prescriptions     Pending Prescriptions Disp Refills    furosemide (LASIX) 20 MG tablet [Pharmacy Med Name: FUROSEMIDE 20MG TABLETS] 180 tablet      Sig: TAKE 1 TABLET BY MOUTH TWICE DAILY

## 2023-09-01 RX ORDER — FUROSEMIDE 20 MG/1
TABLET ORAL
Qty: 180 TABLET | Refills: 1 | Status: SHIPPED | OUTPATIENT
Start: 2023-09-01

## 2023-09-05 RX ORDER — CEPHALEXIN 500 MG/1
500 CAPSULE ORAL 4 TIMES DAILY
Qty: 40 CAPSULE | Refills: 0 | Status: SHIPPED | OUTPATIENT
Start: 2023-09-05 | End: 2023-10-27 | Stop reason: ALTCHOICE

## 2023-09-29 DIAGNOSIS — G89.29 CHRONIC BILATERAL LOW BACK PAIN WITH LEFT-SIDED SCIATICA: Primary | ICD-10-CM

## 2023-09-29 DIAGNOSIS — M54.42 CHRONIC BILATERAL LOW BACK PAIN WITH LEFT-SIDED SCIATICA: Primary | ICD-10-CM

## 2023-09-29 RX ORDER — TRAMADOL HYDROCHLORIDE 50 MG/1
TABLET ORAL
Qty: 120 TABLET | Refills: 5 | Status: SHIPPED | OUTPATIENT
Start: 2023-09-29 | End: 2024-03-27

## 2023-09-29 RX ORDER — VALSARTAN AND HYDROCHLOROTHIAZIDE 320; 25 MG/1; MG/1
1 TABLET, FILM COATED ORAL DAILY
Qty: 90 TABLET | Refills: 1 | Status: SHIPPED | OUTPATIENT
Start: 2023-09-29 | End: 2023-10-27 | Stop reason: SDUPTHER

## 2023-09-29 NOTE — TELEPHONE ENCOUNTER
Requested Prescriptions     Pending Prescriptions Disp Refills    valsartan-hydroCHLOROthiazide (DIOVAN-HCT) 320-25 MG per tablet [Pharmacy Med Name: VALSARTAN/HCTZ 320MG/25MG TABLETS] 90 tablet      Sig: TAKE 1 TABLET BY MOUTH EVERY DAY    traMADol (ULTRAM) 50 MG tablet [Pharmacy Med Name: TRAMADOL 50MG TABLETS] 120 tablet      Sig: TAKE 1 TABLET BY MOUTH EVERY 6 HOURS AS NEEDED FOR PAIN.  MAX DAILY AMOUNT: 200 MG

## 2023-10-27 ENCOUNTER — TELEMEDICINE (OUTPATIENT)
Facility: CLINIC | Age: 73
End: 2023-10-27
Payer: MEDICARE

## 2023-10-27 DIAGNOSIS — E78.5 DYSLIPIDEMIA: ICD-10-CM

## 2023-10-27 DIAGNOSIS — I10 ESSENTIAL HYPERTENSION: Primary | ICD-10-CM

## 2023-10-27 DIAGNOSIS — E87.70 LOCALIZED EDEMA DUE TO FLUID OVERLOAD: ICD-10-CM

## 2023-10-27 DIAGNOSIS — I89.0 LYMPHEDEMA: ICD-10-CM

## 2023-10-27 PROCEDURE — 1090F PRES/ABSN URINE INCON ASSESS: CPT | Performed by: NURSE PRACTITIONER

## 2023-10-27 PROCEDURE — 3017F COLORECTAL CA SCREEN DOC REV: CPT | Performed by: NURSE PRACTITIONER

## 2023-10-27 PROCEDURE — 99214 OFFICE O/P EST MOD 30 MIN: CPT | Performed by: NURSE PRACTITIONER

## 2023-10-27 PROCEDURE — G8427 DOCREV CUR MEDS BY ELIG CLIN: HCPCS | Performed by: NURSE PRACTITIONER

## 2023-10-27 PROCEDURE — G8400 PT W/DXA NO RESULTS DOC: HCPCS | Performed by: NURSE PRACTITIONER

## 2023-10-27 PROCEDURE — 1123F ACP DISCUSS/DSCN MKR DOCD: CPT | Performed by: NURSE PRACTITIONER

## 2023-10-27 RX ORDER — FUROSEMIDE 20 MG/1
20 TABLET ORAL 2 TIMES DAILY
Qty: 180 TABLET | Refills: 1 | Status: SHIPPED | OUTPATIENT
Start: 2023-10-27

## 2023-10-27 RX ORDER — VERAPAMIL HYDROCHLORIDE 240 MG/1
240 CAPSULE, EXTENDED RELEASE ORAL DAILY
Qty: 90 CAPSULE | Refills: 3 | Status: SHIPPED | OUTPATIENT
Start: 2023-10-27

## 2023-10-27 RX ORDER — VALSARTAN AND HYDROCHLOROTHIAZIDE 320; 25 MG/1; MG/1
1 TABLET, FILM COATED ORAL DAILY
Qty: 90 TABLET | Refills: 1 | Status: SHIPPED | OUTPATIENT
Start: 2023-10-27

## 2023-10-27 ASSESSMENT — PATIENT HEALTH QUESTIONNAIRE - PHQ9
SUM OF ALL RESPONSES TO PHQ QUESTIONS 1-9: 0
2. FEELING DOWN, DEPRESSED OR HOPELESS: 0
1. LITTLE INTEREST OR PLEASURE IN DOING THINGS: 0
SUM OF ALL RESPONSES TO PHQ9 QUESTIONS 1 & 2: 0
SUM OF ALL RESPONSES TO PHQ QUESTIONS 1-9: 0

## 2023-10-27 NOTE — PROGRESS NOTES
Chief Complaint   Patient presents with    Hypertension    Cholesterol Problem    Follow-up    Discuss Labs    Post-Op Check     1. Have you been to the ER, urgent care clinic since your last visit? Hospitalized since your last visit? No    2. Have you seen or consulted any other health care providers outside of the [address] [place] [address] Avenue since your last visit? Include any pap smears or colon screening. Yes 09/2023.  [facility]

## 2023-10-27 NOTE — PROGRESS NOTES
Eloina Brambila who was evaluated through a synchronous (real-time) audio-video encounter, and/or the patient's healthcare decision maker, is aware that it is a billable service, which includes applicable co-pays, with coverage as determined by the patient's insurance carrier. Eloina Brambila provided verbal consent to proceed and patient identification was verified. This visit was conducted pursuant to the emergency declaration under the [address] and the [employer] Act. A caregiver was present when appropriate. Ability to conduct physical exam was limited. The patient was located at home in a state where the provider was licensed to provide care. Eloina Brambila (: 1950) is a 68 y.o. female, established patient, here for evaluation of the following chief complaint(s):   Hypertension, Cholesterol Problem, Follow-up, Discuss Labs, and Post-Op Check    SUBJECTIVE/OBJECTIVE:  HPI    Patient is a 68 y.o. female here today with complaints of Hypertension, Cholesterol Problem, Follow-up, Discuss Labs, and Post-Op Check  Patient presenting for Edema,hypertension and hyperlipidemia followup. Vascular sx went well, next f/u dec 29, 2023. Wearing compressions through the day and elevated at night. Continuous with laxis and low sodium diet. Have lost lots of water weight and ambulating with the walker most of the day. Patient reports blood pressures at home most frequently 120/80 or less . Patient reports leg swelling Patient denies chest pain, shortness of breath, orthostatic hypotension, and cough. Patient reports  good compliance with medications and no side effects from medications noted. Current treatments include diet modification, weight loss. Review of Systems   All other systems reviewed and are negative.          No data to display                  Physical Exam  [INSTRUCTIONS:  \"[x]\"

## 2024-02-06 DIAGNOSIS — M1A.00X0 CHRONIC GOUTY ARTHRITIS: Primary | ICD-10-CM

## 2024-02-06 RX ORDER — ALLOPURINOL 100 MG/1
100 TABLET ORAL DAILY
Qty: 90 TABLET | Refills: 1 | Status: SHIPPED | OUTPATIENT
Start: 2024-02-06

## 2024-03-11 DIAGNOSIS — I10 ESSENTIAL HYPERTENSION: ICD-10-CM

## 2024-03-11 DIAGNOSIS — E87.70 LOCALIZED EDEMA DUE TO FLUID OVERLOAD: ICD-10-CM

## 2024-03-11 DIAGNOSIS — E78.5 DYSLIPIDEMIA: ICD-10-CM

## 2024-03-11 RX ORDER — VERAPAMIL HYDROCHLORIDE 240 MG/1
240 CAPSULE, EXTENDED RELEASE ORAL DAILY
Qty: 90 CAPSULE | Refills: 3 | Status: SHIPPED | OUTPATIENT
Start: 2024-03-11

## 2024-03-11 NOTE — TELEPHONE ENCOUNTER
Requested Prescriptions     Pending Prescriptions Disp Refills    verapamil (VERELAN) 240 MG extended release capsule 90 capsule 3     Sig: Take 1 capsule by mouth daily

## 2024-04-02 DIAGNOSIS — M54.42 CHRONIC BILATERAL LOW BACK PAIN WITH LEFT-SIDED SCIATICA: ICD-10-CM

## 2024-04-02 DIAGNOSIS — G89.29 CHRONIC BILATERAL LOW BACK PAIN WITH LEFT-SIDED SCIATICA: ICD-10-CM

## 2024-04-02 NOTE — TELEPHONE ENCOUNTER
Requested Prescriptions     Pending Prescriptions Disp Refills    traMADol (ULTRAM) 50 MG tablet [Pharmacy Med Name: TRAMADOL 50MG TABLETS] 120 tablet      Sig: TAKE 1 TABLET BY MOUTH EVERY 6 HOURS AS NEEDED FOR PAIN. MAX DAILY AMOUNT: 200 MG

## 2024-04-04 RX ORDER — TRAMADOL HYDROCHLORIDE 50 MG/1
50 TABLET ORAL EVERY 8 HOURS PRN
Qty: 120 TABLET | Refills: 2 | Status: SHIPPED | OUTPATIENT
Start: 2024-04-04 | End: 2024-10-01

## 2024-07-25 DIAGNOSIS — M54.42 CHRONIC BILATERAL LOW BACK PAIN WITH LEFT-SIDED SCIATICA: ICD-10-CM

## 2024-07-25 DIAGNOSIS — G89.29 CHRONIC BILATERAL LOW BACK PAIN WITH LEFT-SIDED SCIATICA: ICD-10-CM

## 2024-07-26 NOTE — TELEPHONE ENCOUNTER
Requested Prescriptions     Pending Prescriptions Disp Refills    traMADol (ULTRAM) 50 MG tablet [Pharmacy Med Name: TRAMADOL 50MG TABLETS] 120 tablet      Sig: TAKE 1 TABLET BY MOUTH EVERY 8 HOURS AS NEEDED FOR PAIN. MAX DAILY AMOUNT: 150 MG

## 2024-07-27 RX ORDER — TRAMADOL HYDROCHLORIDE 50 MG/1
50 TABLET ORAL EVERY 8 HOURS PRN
Qty: 90 TABLET | Refills: 0 | Status: SHIPPED | OUTPATIENT
Start: 2024-07-27 | End: 2024-08-26

## 2024-08-14 ENCOUNTER — TELEPHONE (OUTPATIENT)
Facility: CLINIC | Age: 74
End: 2024-08-14

## 2024-08-14 NOTE — TELEPHONE ENCOUNTER
----- Message from Abdi PATEL sent at 8/14/2024  4:35 PM EDT -----  Regarding: E[facility] Appointment Request  ECC Appointment Request    Patient needs appointment for ECC Appointment Type: Existing Condition Follow Up.    Patient Requested Dates(s):As soon as possible  Patient Requested Time:Any time   Provider Name:Jos AdoreKULDIP Vazquez - CNP    Reason for Appointment Request: Established Patient - Available appointments did not meet patient need  Additional Information: Patient wanted to see her PCP regarding for a medication.  --------------------------------------------------------------------------------------------------------------------------    Relationship to Patient: Self     Call Back Information: OK to leave message on voicemail  Preferred Call Back Number: Phone

## 2024-08-15 ENCOUNTER — TELEPHONE (OUTPATIENT)
Facility: CLINIC | Age: 74
End: 2024-08-15

## 2024-08-15 NOTE — TELEPHONE ENCOUNTER
----- Message from Nathalia SAWYER sent at 8/15/2024  1:59 PM EDT -----  Regarding: ECC Appointment Request  ECC Appointment Request    Patient needs appointment for ECC Appointment Type: Existing Condition Follow Up.    Patient Requested Dates(s):As soon as possible  Patient Requested Time: As soon as possible  Provider Name:    Adore Arguello, KULDIP - CNP        Reason for Appointment Request: Established Patient - No appointments available during search  --------------------------------------------------------------------------------------------------------------------------    Relationship to Patient: Self     Call Back Information: Do not leave any message, patient will call back for answer  Preferred Call Back Number: Phone     [phone]

## 2024-08-19 ENCOUNTER — TELEPHONE (OUTPATIENT)
Facility: CLINIC | Age: 74
End: 2024-08-19

## 2024-08-20 ENCOUNTER — TELEPHONE (OUTPATIENT)
Facility: CLINIC | Age: 74
End: 2024-08-20

## 2024-08-20 ENCOUNTER — OFFICE VISIT (OUTPATIENT)
Facility: CLINIC | Age: 74
End: 2024-08-20
Payer: MEDICARE

## 2024-08-20 VITALS
HEART RATE: 84 BPM | RESPIRATION RATE: 18 BRPM | SYSTOLIC BLOOD PRESSURE: 138 MMHG | DIASTOLIC BLOOD PRESSURE: 81 MMHG | TEMPERATURE: 97.9 F | BODY MASS INDEX: 56.15 KG/M2 | OXYGEN SATURATION: 96 % | HEIGHT: 60 IN | WEIGHT: 286 LBS

## 2024-08-20 DIAGNOSIS — L03.116 CELLULITIS OF LEFT LOWER EXTREMITY: ICD-10-CM

## 2024-08-20 DIAGNOSIS — I10 ESSENTIAL (PRIMARY) HYPERTENSION: ICD-10-CM

## 2024-08-20 DIAGNOSIS — Z23 FLU VACCINE NEED: ICD-10-CM

## 2024-08-20 DIAGNOSIS — R92.30 DENSE BREAST TISSUE ON MAMMOGRAM, UNSPECIFIED TYPE: ICD-10-CM

## 2024-08-20 DIAGNOSIS — E78.5 HYPERLIPIDEMIA, UNSPECIFIED HYPERLIPIDEMIA TYPE: ICD-10-CM

## 2024-08-20 DIAGNOSIS — Z12.31 SCREENING MAMMOGRAM, ENCOUNTER FOR: ICD-10-CM

## 2024-08-20 DIAGNOSIS — M54.42 CHRONIC BILATERAL LOW BACK PAIN WITH LEFT-SIDED SCIATICA: ICD-10-CM

## 2024-08-20 DIAGNOSIS — G89.29 CHRONIC BILATERAL LOW BACK PAIN WITH LEFT-SIDED SCIATICA: ICD-10-CM

## 2024-08-20 DIAGNOSIS — Z79.4 TYPE 2 DIABETES MELLITUS WITHOUT COMPLICATION, WITH LONG-TERM CURRENT USE OF INSULIN (HCC): Primary | ICD-10-CM

## 2024-08-20 DIAGNOSIS — E11.9 TYPE 2 DIABETES MELLITUS WITHOUT COMPLICATION, WITH LONG-TERM CURRENT USE OF INSULIN (HCC): Primary | ICD-10-CM

## 2024-08-20 DIAGNOSIS — Z00.00 MEDICARE ANNUAL WELLNESS VISIT, SUBSEQUENT: ICD-10-CM

## 2024-08-20 PROBLEM — M54.17 LUMBOSACRAL RADICULITIS: Status: ACTIVE | Noted: 2018-08-07

## 2024-08-20 LAB — HBA1C MFR BLD: 5.8 %

## 2024-08-20 PROCEDURE — 1036F TOBACCO NON-USER: CPT | Performed by: NURSE PRACTITIONER

## 2024-08-20 PROCEDURE — G0439 PPPS, SUBSEQ VISIT: HCPCS | Performed by: NURSE PRACTITIONER

## 2024-08-20 PROCEDURE — G8417 CALC BMI ABV UP PARAM F/U: HCPCS | Performed by: NURSE PRACTITIONER

## 2024-08-20 PROCEDURE — 2022F DILAT RTA XM EVC RTNOPTHY: CPT | Performed by: NURSE PRACTITIONER

## 2024-08-20 PROCEDURE — 99213 OFFICE O/P EST LOW 20 MIN: CPT | Performed by: NURSE PRACTITIONER

## 2024-08-20 PROCEDURE — 1090F PRES/ABSN URINE INCON ASSESS: CPT | Performed by: NURSE PRACTITIONER

## 2024-08-20 PROCEDURE — 1123F ACP DISCUSS/DSCN MKR DOCD: CPT | Performed by: NURSE PRACTITIONER

## 2024-08-20 PROCEDURE — 83036 HEMOGLOBIN GLYCOSYLATED A1C: CPT | Performed by: NURSE PRACTITIONER

## 2024-08-20 PROCEDURE — G0008 ADMIN INFLUENZA VIRUS VAC: HCPCS | Performed by: NURSE PRACTITIONER

## 2024-08-20 PROCEDURE — 3046F HEMOGLOBIN A1C LEVEL >9.0%: CPT | Performed by: NURSE PRACTITIONER

## 2024-08-20 PROCEDURE — 3075F SYST BP GE 130 - 139MM HG: CPT | Performed by: NURSE PRACTITIONER

## 2024-08-20 PROCEDURE — G8427 DOCREV CUR MEDS BY ELIG CLIN: HCPCS | Performed by: NURSE PRACTITIONER

## 2024-08-20 PROCEDURE — 90653 IIV ADJUVANT VACCINE IM: CPT | Performed by: NURSE PRACTITIONER

## 2024-08-20 PROCEDURE — G8400 PT W/DXA NO RESULTS DOC: HCPCS | Performed by: NURSE PRACTITIONER

## 2024-08-20 PROCEDURE — 3079F DIAST BP 80-89 MM HG: CPT | Performed by: NURSE PRACTITIONER

## 2024-08-20 PROCEDURE — 3017F COLORECTAL CA SCREEN DOC REV: CPT | Performed by: NURSE PRACTITIONER

## 2024-08-20 RX ORDER — TRAMADOL HYDROCHLORIDE 50 MG/1
50 TABLET ORAL EVERY 8 HOURS PRN
Qty: 90 TABLET | Refills: 0 | Status: SHIPPED | OUTPATIENT
Start: 2024-08-20 | End: 2024-09-19

## 2024-08-20 SDOH — ECONOMIC STABILITY: FOOD INSECURITY: WITHIN THE PAST 12 MONTHS, YOU WORRIED THAT YOUR FOOD WOULD RUN OUT BEFORE YOU GOT MONEY TO BUY MORE.: NEVER TRUE

## 2024-08-20 SDOH — ECONOMIC STABILITY: FOOD INSECURITY: WITHIN THE PAST 12 MONTHS, THE FOOD YOU BOUGHT JUST DIDN'T LAST AND YOU DIDN'T HAVE MONEY TO GET MORE.: NEVER TRUE

## 2024-08-20 SDOH — ECONOMIC STABILITY: INCOME INSECURITY: HOW HARD IS IT FOR YOU TO PAY FOR THE VERY BASICS LIKE FOOD, HOUSING, MEDICAL CARE, AND HEATING?: NOT VERY HARD

## 2024-08-20 SDOH — ECONOMIC STABILITY: INCOME INSECURITY: IN THE LAST 12 MONTHS, WAS THERE A TIME WHEN YOU WERE NOT ABLE TO PAY THE MORTGAGE OR RENT ON TIME?: YES

## 2024-08-20 SDOH — HEALTH STABILITY: PHYSICAL HEALTH: ON AVERAGE, HOW MANY MINUTES DO YOU ENGAGE IN EXERCISE AT THIS LEVEL?: 0 MIN

## 2024-08-20 SDOH — ECONOMIC STABILITY: TRANSPORTATION INSECURITY
IN THE PAST 12 MONTHS, HAS LACK OF TRANSPORTATION KEPT YOU FROM MEETINGS, WORK, OR FROM GETTING THINGS NEEDED FOR DAILY LIVING?: NO

## 2024-08-20 SDOH — HEALTH STABILITY: PHYSICAL HEALTH: ON AVERAGE, HOW MANY DAYS PER WEEK DO YOU ENGAGE IN MODERATE TO STRENUOUS EXERCISE (LIKE A BRISK WALK)?: 0 DAYS

## 2024-08-20 SDOH — ECONOMIC STABILITY: TRANSPORTATION INSECURITY
IN THE PAST 12 MONTHS, HAS THE LACK OF TRANSPORTATION KEPT YOU FROM MEDICAL APPOINTMENTS OR FROM GETTING MEDICATIONS?: NO

## 2024-08-20 SDOH — HEALTH STABILITY: MENTAL HEALTH
STRESS IS WHEN SOMEONE FEELS TENSE, NERVOUS, ANXIOUS, OR CAN'T SLEEP AT NIGHT BECAUSE THEIR MIND IS TROUBLED. HOW STRESSED ARE YOU?: NOT AT ALL

## 2024-08-20 ASSESSMENT — PATIENT HEALTH QUESTIONNAIRE - PHQ9
SUM OF ALL RESPONSES TO PHQ QUESTIONS 1-9: 0
2. FEELING DOWN, DEPRESSED OR HOPELESS: NOT AT ALL
SUM OF ALL RESPONSES TO PHQ QUESTIONS 1-9: 0
SUM OF ALL RESPONSES TO PHQ QUESTIONS 1-9: 0
SUM OF ALL RESPONSES TO PHQ9 QUESTIONS 1 & 2: 0
1. LITTLE INTEREST OR PLEASURE IN DOING THINGS: NOT AT ALL
SUM OF ALL RESPONSES TO PHQ QUESTIONS 1-9: 0

## 2024-08-20 ASSESSMENT — LIFESTYLE VARIABLES
HOW OFTEN DO YOU HAVE A DRINK CONTAINING ALCOHOL: NEVER
HOW MANY STANDARD DRINKS CONTAINING ALCOHOL DO YOU HAVE ON A TYPICAL DAY: PATIENT DOES NOT DRINK

## 2024-08-20 NOTE — PROGRESS NOTES
[facility]  [address] [place] [address] [address]   [place] [address]  (P) [phone]   (F) [phone]    Subjective:   Daina Soto is a 74 y.o. female  established here with complaints of Medicare AWV and Blister (Left leg)   Reports went to [place] 5 weeks ago to  grandchildren, was wearing compression during the trip. Removed compression half way through drive and noticed a fluid filled blister. Blister had improved and scabbed over but did not heal all the way. Then she noticed more fluid filled blisters and erythema. States that area was red and warm but has improved now, just blisters present. Patient states that vascular surgeon wanted to do another procedure but she does not want to do it.   States that last visit with vascular doctor was 3 months ago, she plans to make an appointment with them.   States that she is wearing compression stockings and wrapping left leg with ace wrap and she is taking the lasix and elevating her legs regularly.   She denies any pain to her calf.     Patient Active Problem List   Diagnosis    Hypercalcemia    Pain in right arm    Spondylolisthesis    Morbid obesity (HCC)    Lumbago with sciatica    Disorder of soft tissue    Chronic neck and back pain    Localized edema due to fluid overload    Gout    Knee pain    Chronic gouty arthritis    Dyslipidemia    Dependent edema    Impaired glucose tolerance    Visual impairment    Intercostal neuralgia    Lymphedema    Displacement of lumbar intervertebral disc without myelopathy    Essential hypertension    Lumbosacral radiculitis      Current Outpatient Medications   Medication Sig Dispense Refill    traMADol (ULTRAM) 50 MG tablet Take 1 tablet by mouth every 8 hours as needed for Pain for up to 30 days. Max Daily Amount: 150 mg 90 tablet 0    verapamil (VERELAN) 240 MG extended release capsule Take 1 capsule by mouth daily 90 capsule 3    allopurinol (ZYLOPRIM) 100 MG tablet TAKE 1 TABLET BY

## 2024-08-20 NOTE — PROGRESS NOTES
No    Intervention:  has NO advanced directive - not interested in additional information             Objective   Vitals:    08/20/24 1318 08/20/24 1348   BP: (!) 148/81 138/81   Site: Right Upper Arm    Position: Sitting    Cuff Size: Large Adult    Pulse: 84    Resp: 18    Temp: 97.9 °F (36.6 °C)    TempSrc: Temporal    SpO2: 96%    Weight: 129.7 kg (286 lb)    Height: 1.524 m (5')       Body mass index is 55.86 kg/m².        General Appearance: alert and oriented to person, place and time, well developed and well- nourished, in no acute distress  Skin: warm and dry, no rash or erythema  Head: normocephalic and atraumatic  Eyes: pupils equal, round, and reactive to light, extraocular eye movements intact, conjunctivae normal  ENT: tympanic membrane, external ear and ear canal normal bilaterally, nose without deformity, nasal mucosa and turbinates normal without polyps  Neck: supple and non-tender without mass, no thyromegaly or thyroid nodules, no cervical lymphadenopathy  Pulmonary/Chest: clear to auscultation bilaterally- no wheezes, rales or rhonchi, normal air movement, no respiratory distress  Cardiovascular: normal rate, regular rhythm, normal S1 and S2, no murmurs, rubs, clicks, or gallops, distal pulses intact, no carotid bruits  Abdomen: soft, non-tender, non-distended, normal bowel sounds, no masses or organomegaly  Neurologic: reflexes normal and symmetric, no cranial nerve deficit, gait, coordination and speech normal            Allergies   Allergen Reactions    Gabapentin Other (See Comments)    Metformin And Related Nausea And Vomiting     Prior to Visit Medications    Medication Sig Taking? Authorizing Provider   cephALEXin (KEFLEX) 500 MG capsule Take 1 capsule by mouth 4 times daily Yes Adore Arguello APRN - CNP   sulfamethoxazole-trimethoprim (BACTRIM DS;SEPTRA DS) 800-160 MG per tablet Take 1 tablet by mouth 2 times daily for 10 days Yes Adore Arguello APRN - CNP   traMADol  (ULTRAM) 50 MG tablet Take 1 tablet by mouth every 8 hours as needed for Pain for up to 30 days. Max Daily Amount: 150 mg Yes Adore Arguello APRN - CNP   verapamil (VERELAN) 240 MG extended release capsule Take 1 capsule by mouth daily Yes Adore Arguello APRN - CNP   allopurinol (ZYLOPRIM) 100 MG tablet TAKE 1 TABLET BY MOUTH DAILY Yes Adore Arguello APRN - CNP   furosemide (LASIX) 20 MG tablet Take 1 tablet by mouth 2 times daily Yes Adore Arguello APRN - CNP   valsartan-hydroCHLOROthiazide (DIOVAN-HCT) 320-25 MG per tablet Take 1 tablet by mouth daily Yes Adore Arguello APRN - CNP   aspirin 81 MG EC tablet aspirin 81 mg tablet,delayed release   TAKE 1 TABLET BY MOUTH DAILY WITH A MEAL Yes Automatic Reconciliation, Ar       CareTeam (Including outside providers/suppliers regularly involved in providing care):   Patient Care Team:  Adore rAguello APRN - CNP as PCP - General  Adore Arguello APRN - CNP as PCP - [facility]      Reviewed and updated this visit:  Tobacco  Allergies  Meds  Problems  Med Hx  Surg Hx  Soc Hx  Fam Hx      Aspects of this note may have been generated using voice recognition software. Despite editing, there may be some syntax errors.   KULDIP Wilkins CNP

## 2024-08-20 NOTE — PROGRESS NOTES
\"Have you been to the ER, urgent care clinic since your last visit?  Hospitalized since your last visit?\"    NO    “Have you seen or consulted any other health care providers outside of [facility] since your last visit?”    NO    Have you had a mammogram?”   NO        “Have you had a colorectal cancer screening such as a colonoscopy/FIT/Cologuard?    No    Chief Complaint   Patient presents with    Medicare AWV    Blister     Left leg     BP (!) 148/81 (Site: Right Upper Arm, Position: Sitting, Cuff Size: Large Adult) Comment: No medication today  Pulse 84   Temp 97.9 °F (36.6 °C) (Temporal)   Resp 18   Ht 1.524 m (5')   Wt 129.7 kg (286 lb)   SpO2 96%   BMI 55.86 kg/m²               Click Here for Release of Records Request

## 2024-08-20 NOTE — PATIENT INSTRUCTIONS
Learning About Being Active as an Older Adult  Why is being active important as you get older?     Being active is one of the best things you can do for your health. And it's never too late to start. Being active--or getting active, if you aren't already--has definite benefits. It can:  Give you more energy,  Keep your mind sharp.  Improve balance to reduce your risk of falls.  Help you manage chronic illness with fewer medicines.  No matter how old you are, how fit you are, or what health problems you have, there is a form of activity that will work for you. And the more physical activity you can do, the better your overall health will be.  What kinds of activity can help you stay healthy?  Being more active will make your daily activities easier. Physical activity includes planned exercise and things you do in daily life. There are four types of activity:  Aerobic.  Doing aerobic activity makes your heart and lungs strong.  Includes walking, dancing, and gardening.  Aim for at least 2½ hours spread throughout the week.  It improves your energy and can help you sleep better.  Muscle-strengthening.  This type of activity can help maintain muscle and strengthen bones.  Includes climbing stairs, using resistance bands, and lifting or carrying heavy loads.  Aim for at least twice a week.  It can help protect the knees and other joints.  Stretching.  Stretching gives you better range of motion in joints and muscles.  Includes upper arm stretches, calf stretches, and gentle yoga.  Aim for at least twice a week, preferably after your muscles are warmed up from other activities.  It can help you function better in daily life.  Balancing.  This helps you stay coordinated and have good posture.  Includes heel-to-toe walking, valerio chi, and certain types of yoga.  Aim for at least 3 days a week.  It can reduce your risk of falling.  Even if you have a hard time meeting the recommendations, it's better to be more active

## 2024-08-20 NOTE — TELEPHONE ENCOUNTER
Is this requesting for an order of some sort?  [x]Yes    []No  If Yes, what is being requested?  Ointment for sore on leg    Is the patient calling about a new issue (illness, pain, etc)?  []Yes    [x]No  If yes, when did this specific issue start?      ENC Note: Patient states she discussed the sore on her leg and was under the impression an ointment would be prescribed.      Best Contact number: [phone]

## 2024-08-21 LAB
ALBUMIN SERPL-MCNC: 4 G/DL (ref 3.8–4.8)
ALP SERPL-CCNC: 71 IU/L (ref 44–121)
ALT SERPL-CCNC: 12 IU/L (ref 0–32)
AST SERPL-CCNC: 20 IU/L (ref 0–40)
BASOPHILS # BLD AUTO: 0 X10E3/UL (ref 0–0.2)
BASOPHILS NFR BLD AUTO: 1 %
BILIRUB SERPL-MCNC: 0.5 MG/DL (ref 0–1.2)
BUN SERPL-MCNC: 23 MG/DL (ref 8–27)
BUN/CREAT SERPL: 26 (ref 12–28)
CALCIUM SERPL-MCNC: 11.5 MG/DL (ref 8.7–10.3)
CHLORIDE SERPL-SCNC: 97 MMOL/L (ref 96–106)
CHOLEST SERPL-MCNC: 204 MG/DL (ref 100–199)
CO2 SERPL-SCNC: 24 MMOL/L (ref 20–29)
CREAT SERPL-MCNC: 0.87 MG/DL (ref 0.57–1)
EGFRCR SERPLBLD CKD-EPI 2021: 70 ML/MIN/1.73
EOSINOPHIL # BLD AUTO: 0.2 X10E3/UL (ref 0–0.4)
EOSINOPHIL NFR BLD AUTO: 4 %
ERYTHROCYTE [DISTWIDTH] IN BLOOD BY AUTOMATED COUNT: 14.6 % (ref 11.7–15.4)
GLOBULIN SER CALC-MCNC: 3.9 G/DL (ref 1.5–4.5)
GLUCOSE SERPL-MCNC: 89 MG/DL (ref 70–99)
HCT VFR BLD AUTO: 36.3 % (ref 34–46.6)
HDLC SERPL-MCNC: 77 MG/DL
HGB BLD-MCNC: 11.8 G/DL (ref 11.1–15.9)
IMM GRANULOCYTES # BLD AUTO: 0 X10E3/UL (ref 0–0.1)
IMM GRANULOCYTES NFR BLD AUTO: 0 %
LDLC SERPL CALC-MCNC: 114 MG/DL (ref 0–99)
LYMPHOCYTES # BLD AUTO: 1.5 X10E3/UL (ref 0.7–3.1)
LYMPHOCYTES NFR BLD AUTO: 29 %
MCH RBC QN AUTO: 28.2 PG (ref 26.6–33)
MCHC RBC AUTO-ENTMCNC: 32.5 G/DL (ref 31.5–35.7)
MCV RBC AUTO: 87 FL (ref 79–97)
MONOCYTES # BLD AUTO: 0.7 X10E3/UL (ref 0.1–0.9)
MONOCYTES NFR BLD AUTO: 14 %
NEUTROPHILS # BLD AUTO: 2.7 X10E3/UL (ref 1.4–7)
NEUTROPHILS NFR BLD AUTO: 52 %
PLATELET # BLD AUTO: 226 X10E3/UL (ref 150–450)
POTASSIUM SERPL-SCNC: 4 MMOL/L (ref 3.5–5.2)
PROT SERPL-MCNC: 7.9 G/DL (ref 6–8.5)
RBC # BLD AUTO: 4.19 X10E6/UL (ref 3.77–5.28)
SODIUM SERPL-SCNC: 136 MMOL/L (ref 134–144)
TRIGL SERPL-MCNC: 71 MG/DL (ref 0–149)
TSH SERPL DL<=0.005 MIU/L-ACNC: 1.64 UIU/ML (ref 0.45–4.5)
VLDLC SERPL CALC-MCNC: 13 MG/DL (ref 5–40)
WBC # BLD AUTO: 5.2 X10E3/UL (ref 3.4–10.8)

## 2024-08-21 NOTE — TELEPHONE ENCOUNTER
Is this requesting for an order of some sort?  []Yes    [x]No  If Yes, what is being requested?  Leg Antibiotic medication    Is the patient calling about a new issue (illness, pain, etc)?  []Yes    [x]No  If yes, when did this specific issue start?      ENC Note:    Patient was seen 8/20/24 please see prior message.  Patient is calling the office upset that the antibiotic for her sore on the leg was not called in yet.  That was the reason pt came into the office.      *Update-pt called again 8/21 at 1:33pm this time more upset about not being contacted about the medication she is needing and stated she will have to go somewhere else if this is not addressed today.  Please call patient.    Best Contact number: [facility] on [address].  Please call patient at [phone]

## 2024-08-22 LAB
ALBUMIN/CREAT UR: 15 MG/G CREAT (ref 0–29)
CREAT UR-MCNC: 71.6 MG/DL
MICROALBUMIN UR-MCNC: 10.9 UG/ML

## 2024-08-23 RX ORDER — SULFAMETHOXAZOLE/TRIMETHOPRIM 800-160 MG
1 TABLET ORAL 2 TIMES DAILY
Qty: 20 TABLET | Refills: 0 | Status: SHIPPED | OUTPATIENT
Start: 2024-08-23 | End: 2024-09-02

## 2024-08-23 RX ORDER — CEPHALEXIN 500 MG/1
500 CAPSULE ORAL 4 TIMES DAILY
Qty: 40 CAPSULE | Refills: 0 | Status: SHIPPED | OUTPATIENT
Start: 2024-08-23

## 2024-08-27 NOTE — RESULT ENCOUNTER NOTE
Glucose, kidneys, liver all within normal limits  Cholesterol elevated however we will not need to make changes to current regimen.   At this time we will recommend the following:  Reduce saturated fats. Saturated fats, found primarily in red meat and full-fat dairy products, raise your total cholesterol. .  Eat foods rich in omega-3 fatty acids. Such as fish  Increase soluble fiber. More veggies in the diet 4-5 cups daily  30 minutes of exercise daily 5-6 days per week.  Follow-up as previously scheduled or sooner as needed.

## 2024-09-19 DIAGNOSIS — M54.42 CHRONIC BILATERAL LOW BACK PAIN WITH LEFT-SIDED SCIATICA: ICD-10-CM

## 2024-09-19 DIAGNOSIS — G89.29 CHRONIC BILATERAL LOW BACK PAIN WITH LEFT-SIDED SCIATICA: ICD-10-CM

## 2024-09-20 RX ORDER — TRAMADOL HYDROCHLORIDE 50 MG/1
50 TABLET ORAL EVERY 8 HOURS PRN
Qty: 90 TABLET | Refills: 0 | Status: SHIPPED | OUTPATIENT
Start: 2024-09-20 | End: 2024-10-20

## 2024-10-09 DIAGNOSIS — I10 ESSENTIAL HYPERTENSION: ICD-10-CM

## 2024-10-09 DIAGNOSIS — E78.5 DYSLIPIDEMIA: ICD-10-CM

## 2024-10-09 DIAGNOSIS — E87.70 LOCALIZED EDEMA DUE TO FLUID OVERLOAD: ICD-10-CM

## 2024-10-09 NOTE — TELEPHONE ENCOUNTER
Requested Prescriptions     Pending Prescriptions Disp Refills    valsartan-hydroCHLOROthiazide (DIOVAN-HCT) 320-25 MG per tablet [Pharmacy Med Name: VALSARTAN/HCTZ 320MG/25MG TABLETS] 90 tablet 1     Sig: TAKE 1 TABLET BY MOUTH EVERY DAY            Date of last OV:8/20/24  Future OV visit scheduled:  [x] Yes -> Date: 2/18/25  [] No    Last Refill: [] N/A  Date:10/27/23  Qty:90  # of refills:1    Med pending for provider review:  [x] Yes  [] No (provide reason why):     Requested Pharmacy updated in ENC:  [x] Yes    Applicable labs (provide date of completion):  [] Diabetic med- A1c:  [] Cholesterol med- Lipids:  [] BP med- CMP or BMP:   [] Thyroid med- TSH:  [] Gout med- Uric acid:  [] Prostate med- PSA:  [] Other (provide what type of med and lab):    Additional notes:

## 2024-10-10 ENCOUNTER — TELEPHONE (OUTPATIENT)
Facility: CLINIC | Age: 74
End: 2024-10-10

## 2024-10-10 DIAGNOSIS — L98.9 LEG SORE: Primary | ICD-10-CM

## 2024-10-10 NOTE — TELEPHONE ENCOUNTER
Is this requesting for an order of some sort?  [x]Yes    []No  If Yes, what is being requested?  Home healthcare for wound care    Is the patient calling about a new issue (illness, pain, etc)?  [x]Yes    []No  If yes, when did this specific issue start?  Sores on bilateral legs    ENC Note: pt seen vascular doctor today who advised her to speak to her pcp and get home health ordered for her sores she needs wrapped.      Best Contact number:  [phone] cell.  Please call pt with update

## 2024-10-11 RX ORDER — VALSARTAN AND HYDROCHLOROTHIAZIDE 320; 25 MG/1; MG/1
1 TABLET, FILM COATED ORAL DAILY
Qty: 90 TABLET | Refills: 1 | Status: SHIPPED | OUTPATIENT
Start: 2024-10-11

## 2024-10-20 DIAGNOSIS — M54.42 CHRONIC BILATERAL LOW BACK PAIN WITH LEFT-SIDED SCIATICA: ICD-10-CM

## 2024-10-20 DIAGNOSIS — G89.29 CHRONIC BILATERAL LOW BACK PAIN WITH LEFT-SIDED SCIATICA: ICD-10-CM

## 2024-10-21 RX ORDER — TRAMADOL HYDROCHLORIDE 50 MG/1
50 TABLET ORAL EVERY 8 HOURS PRN
Qty: 90 TABLET | Refills: 1 | Status: SHIPPED | OUTPATIENT
Start: 2024-10-21 | End: 2025-01-19

## 2024-10-21 NOTE — TELEPHONE ENCOUNTER
Requested Prescriptions     Pending Prescriptions Disp Refills    traMADol (ULTRAM) 50 MG tablet [Pharmacy Med Name: TRAMADOL 50MG TABLETS] 90 tablet      Sig: TAKE 1 TABLET BY MOUTH EVERY 8 HOURS AS NEEDED FOR PAIN. MAX DAILY AMOUNT: 150 MG      Date of last OV: 08/20/2024  Future OV visit scheduled:  [x] Yes -> Date: 02/18/2025  [] No    Last Refill: [] N/A  Date: 08/20/2024  Qty: 90  # of refills: 0    Med pending for provider review:  [x] Yes  [] No (provide reason why):     Requested Pharmacy updated in ENC:  [] Yes    Applicable labs (provide date of completion):  [] Diabetic med- A1c:  [] Cholesterol med- Lipids:  [] BP med- CMP or BMP:   [] Thyroid med- TSH:  [] Gout med- Uric acid:  [] Prostate med- PSA:  [] Other (provide what type of med and lab):    Additional notes: medication was only wrote for a 30 day supply.

## 2024-10-31 ENCOUNTER — TELEPHONE (OUTPATIENT)
Facility: CLINIC | Age: 74
End: 2024-10-31

## 2024-10-31 NOTE — TELEPHONE ENCOUNTER
Is this requesting for an order of some sort?  [x]Yes    []No  If Yes, what is being requested?  Order has been signed but needs to add PT (check the box)    Is the patient calling about a new issue (illness, pain, etc)?  []Yes    []No  If yes, when did this specific issue start?      ENC Note:Heike from [employer] called and states we had signed order for patient and faxed; however, need to check the box for PT and refax to them.  Order has been printed and put in [employer] box to be signed.      Best Contact number:

## 2024-11-29 DIAGNOSIS — M1A.00X0 CHRONIC GOUTY ARTHRITIS: ICD-10-CM

## 2024-12-02 NOTE — TELEPHONE ENCOUNTER
Requested Prescriptions     Pending Prescriptions Disp Refills    allopurinol (ZYLOPRIM) 100 MG tablet [Pharmacy Med Name: ALLOPURINOL 100MG TABLETS] 90 tablet 1     Sig: TAKE 1 TABLET BY MOUTH DAILY     Date of last OV: 08/20/2024  Future OV visit scheduled:  [x] Yes -> Date: 02/18/2025  [] No    Last Refill: [] N/A  Date:  02.06.2024  Qty: 90  # of refills: 1    Med pending for provider review:  [x] Yes  [] No (provide reason why):     Requested Pharmacy updated in ENC:  [] Yes    Applicable labs (provide date of completion):  [] Diabetic med- A1c:  [] Cholesterol med- Lipids:  [] BP med- CMP or BMP:   [] Thyroid med- TSH:  [] Gout med- Uric acid:  [] Prostate med- PSA:  [] Other (provide what type of med and lab):    Additional notes:

## 2024-12-03 RX ORDER — ALLOPURINOL 100 MG/1
100 TABLET ORAL DAILY
Qty: 90 TABLET | Refills: 1 | Status: SHIPPED | OUTPATIENT
Start: 2024-12-03

## 2024-12-12 DIAGNOSIS — M54.42 CHRONIC BILATERAL LOW BACK PAIN WITH LEFT-SIDED SCIATICA: ICD-10-CM

## 2024-12-12 DIAGNOSIS — G89.29 CHRONIC BILATERAL LOW BACK PAIN WITH LEFT-SIDED SCIATICA: ICD-10-CM

## 2024-12-13 RX ORDER — TRAMADOL HYDROCHLORIDE 50 MG/1
50 TABLET ORAL EVERY 8 HOURS PRN
Qty: 90 TABLET | Refills: 5 | Status: SHIPPED | OUTPATIENT
Start: 2024-12-13 | End: 2025-01-12

## 2024-12-13 NOTE — TELEPHONE ENCOUNTER
Requested Prescriptions     Pending Prescriptions Disp Refills    traMADol (ULTRAM) 50 MG tablet [Pharmacy Med Name: TRAMADOL 50MG TABLETS] 90 tablet      Sig: TAKE 1 TABLET BY MOUTH EVERY 8 HOURS AS NEEDED FOR PAIN. MAX DAILY AMOUNT: 150 MG. REDUCE DOSES TAKEN AS PAIN BECOMES TOLERATED

## 2024-12-24 ENCOUNTER — TELEPHONE (OUTPATIENT)
Facility: CLINIC | Age: 74
End: 2024-12-24

## 2024-12-24 NOTE — TELEPHONE ENCOUNTER
Method of communication:  []Fax []Phone call []In person   []Other:    Who is making request:  What medication/s (include strength and dosing):  Prednisone patch pt is unsure of name please call pt has not had to use in a year    This is for a:   [x]Refill    []New medication request  []Follow up on prior request    Pharmacy: [facility] in [place]  Best contact for patient:    Additional notes: Left ankle gout flare up

## 2024-12-24 NOTE — TELEPHONE ENCOUNTER
Sunita with [facility] health came by the office to make the provider aware that the patient has refused home health. They have tried multiple times to provide wound care and patient has refused

## 2025-03-13 DIAGNOSIS — I10 ESSENTIAL HYPERTENSION: ICD-10-CM

## 2025-03-13 DIAGNOSIS — E78.5 DYSLIPIDEMIA: ICD-10-CM

## 2025-03-13 DIAGNOSIS — E87.70 LOCALIZED EDEMA DUE TO FLUID OVERLOAD: ICD-10-CM

## 2025-03-13 RX ORDER — VERAPAMIL HYDROCHLORIDE 240 MG/1
TABLET, FILM COATED, EXTENDED RELEASE ORAL DAILY
Qty: 90 TABLET | Refills: 0 | Status: SHIPPED | OUTPATIENT
Start: 2025-03-13

## 2025-03-13 NOTE — TELEPHONE ENCOUNTER
Requested Prescriptions     Pending Prescriptions Disp Refills    verapamil (CALAN SR) 240 MG extended release tablet [Pharmacy Med Name: VERAPAMIL ER 240MG TABLETS] 90 tablet      Sig: TAKE 1 CAPSULE BY MOUTH DAILY     Date of last OV: 08.20.2024  Future OV visit scheduled:  [] Yes -> Date:   [x] No    Last Refill: [] N/A  Date: 03.11.2024  Qty: 90  # of refills: 3    Med pending for provider review:  [x] Yes  [] No (provide reason why):     Requested Pharmacy updated in ENC:  [] Yes    Applicable labs (provide date of completion):  [] Diabetic med- A1c:  [] Cholesterol med- Lipids:  [] BP med- CMP or BMP:   [] Thyroid med- TSH:  [] Gout med- Uric acid:  [] Prostate med- PSA:  [] Other (provide what type of med and lab):    Additional notes:

## 2025-05-09 ENCOUNTER — TELEMEDICINE (OUTPATIENT)
Facility: CLINIC | Age: 75
End: 2025-05-09
Payer: MEDICARE

## 2025-05-09 DIAGNOSIS — I89.0 LYMPHEDEMA: ICD-10-CM

## 2025-05-09 DIAGNOSIS — M10.9 GOUT, UNSPECIFIED CAUSE, UNSPECIFIED CHRONICITY, UNSPECIFIED SITE: Primary | ICD-10-CM

## 2025-05-09 PROCEDURE — 3017F COLORECTAL CA SCREEN DOC REV: CPT | Performed by: NURSE PRACTITIONER

## 2025-05-09 PROCEDURE — 1160F RVW MEDS BY RX/DR IN RCRD: CPT | Performed by: NURSE PRACTITIONER

## 2025-05-09 PROCEDURE — 1159F MED LIST DOCD IN RCRD: CPT | Performed by: NURSE PRACTITIONER

## 2025-05-09 PROCEDURE — 1123F ACP DISCUSS/DSCN MKR DOCD: CPT | Performed by: NURSE PRACTITIONER

## 2025-05-09 PROCEDURE — G8427 DOCREV CUR MEDS BY ELIG CLIN: HCPCS | Performed by: NURSE PRACTITIONER

## 2025-05-09 PROCEDURE — 1090F PRES/ABSN URINE INCON ASSESS: CPT | Performed by: NURSE PRACTITIONER

## 2025-05-09 PROCEDURE — 99214 OFFICE O/P EST MOD 30 MIN: CPT | Performed by: NURSE PRACTITIONER

## 2025-05-09 PROCEDURE — G8400 PT W/DXA NO RESULTS DOC: HCPCS | Performed by: NURSE PRACTITIONER

## 2025-05-09 RX ORDER — COLCHICINE 0.6 MG/1
TABLET ORAL
Qty: 30 TABLET | Refills: 3 | Status: SHIPPED | OUTPATIENT
Start: 2025-05-09

## 2025-05-09 RX ORDER — INDOMETHACIN 50 MG/1
50 CAPSULE ORAL 3 TIMES DAILY
Qty: 60 CAPSULE | Refills: 3 | Status: SHIPPED | OUTPATIENT
Start: 2025-05-09

## 2025-05-09 RX ORDER — PREDNISONE 20 MG/1
20 TABLET ORAL 2 TIMES DAILY
Qty: 20 TABLET | Refills: 0 | Status: SHIPPED | OUTPATIENT
Start: 2025-05-09

## 2025-05-09 ASSESSMENT — PATIENT HEALTH QUESTIONNAIRE - PHQ9
SUM OF ALL RESPONSES TO PHQ QUESTIONS 1-9: 0
2. FEELING DOWN, DEPRESSED OR HOPELESS: NOT AT ALL
SUM OF ALL RESPONSES TO PHQ QUESTIONS 1-9: 0
SUM OF ALL RESPONSES TO PHQ QUESTIONS 1-9: 0
1. LITTLE INTEREST OR PLEASURE IN DOING THINGS: NOT AT ALL
SUM OF ALL RESPONSES TO PHQ QUESTIONS 1-9: 0

## 2025-05-09 NOTE — PROGRESS NOTES
[facility]  [address] [place] [address] [address]   [place] [address]  (P) [phone] (F) [phone]    Daina Soto who was evaluated through a synchronous (real-time) audio-video encounter, and/or the patient's healthcare decision maker, is aware that it is a billable service, which includes applicable co-pays, with coverage as determined by the patient's insurance carrier. Daina Soto provided verbal consent to proceed and patient identification was verified. This visit was conducted pursuant to the emergency declaration under the [employer] Act and the National Emergencies Act, 1135 waiver authority and the Coronavirus Preparedness and Response Supplemental Appropriations Act. A caregiver was present when appropriate. Ability to conduct physical exam was limited. The patient was located at home in a state where the provider was licensed to provide care.     Daina Soto (: 1950) is a 75 y.o. female, established patient, here for evaluation of the following chief complaint(s):   Swelling (Face, hands and feet/) and Gout         SUBJECTIVE/OBJECTIVE:  HPI    Patient is a 75 y.o. female here today with complaints of Swelling (Face, hands and feet/) and Gout  With lymphedema, has changed diet and exercises and improving.   Gout after eating greens, but improving as well.      Review of Systems   All other systems reviewed and are negative.         No data to display                  Physical Exam  [INSTRUCTIONS:  \"[x]\" Indicates a positive item  \"[]\" Indicates a negative item  -- DELETE ALL ITEMS NOT EXAMINED]    Constitutional: [x] Appears well-developed and well-nourished [x] No apparent distress      [] Abnormal -     Mental status: [x] Alert and awake  [x] Oriented to person/place/time [x] Able to follow commands    [] Abnormal -     Eyes:   EOM    [x]  Normal    [] Abnormal -   Sclera  [x]  Normal    [] Abnormal -          Discharge [x]  None visible   [] Abnormal -

## 2025-05-09 NOTE — PROGRESS NOTES
Have you been to the ER, urgent care clinic since your last visit?  Hospitalized since your last visit?   NO    Have you seen or consulted any other health care providers outside our system since your last visit?   NO      “Have you had a colorectal cancer screening such as a colonoscopy/FIT/Cologuard?    NO    Date of last Colonoscopy: 9/19/2016  Date of last Cologuard: 1/11/2023  No FIT/FOBT on file   No flexible sigmoidoscopy on file     “Have you had a diabetic eye exam?”    NO     No diabetic eye exam on file          Chief Complaint   Patient presents with    Swelling     Face, hands and feet      Gout

## 2025-05-27 DIAGNOSIS — I10 ESSENTIAL HYPERTENSION: ICD-10-CM

## 2025-05-27 DIAGNOSIS — E87.70 LOCALIZED EDEMA DUE TO FLUID OVERLOAD: ICD-10-CM

## 2025-05-27 DIAGNOSIS — E78.5 DYSLIPIDEMIA: ICD-10-CM

## 2025-05-28 ENCOUNTER — TELEPHONE (OUTPATIENT)
Facility: CLINIC | Age: 75
End: 2025-05-28

## 2025-05-28 NOTE — TELEPHONE ENCOUNTER
Requested Prescriptions     Pending Prescriptions Disp Refills    valsartan-hydroCHLOROthiazide (DIOVAN-HCT) 320-25 MG per tablet [Pharmacy Med Name: VALSARTAN/HCTZ 320MG/25MG TABLETS] 90 tablet 1     Sig: TAKE 1 TABLET BY MOUTH EVERY DAY

## 2025-05-28 NOTE — TELEPHONE ENCOUNTER
Patient called and wanted to know if medication has been ordered.  Patient informed message  was sent to provider for refill.

## 2025-05-29 RX ORDER — VALSARTAN AND HYDROCHLOROTHIAZIDE 320; 25 MG/1; MG/1
1 TABLET, FILM COATED ORAL DAILY
Qty: 90 TABLET | Refills: 1 | Status: SHIPPED | OUTPATIENT
Start: 2025-05-29

## 2025-06-08 ENCOUNTER — HOSPITAL ENCOUNTER (INPATIENT)
Facility: HOSPITAL | Age: 75
LOS: 3 days | Discharge: NURSING, REHAB OR LONG-TERM CARE | DRG: 300 | End: 2025-06-11
Attending: INTERNAL MEDICINE | Admitting: INTERNAL MEDICINE
Payer: MEDICARE

## 2025-06-08 ENCOUNTER — APPOINTMENT (OUTPATIENT)
Facility: HOSPITAL | Age: 75
End: 2025-06-08
Payer: MEDICARE

## 2025-06-08 ENCOUNTER — HOSPITAL ENCOUNTER (EMERGENCY)
Facility: HOSPITAL | Age: 75
Discharge: ANOTHER HOSPITAL | End: 2025-06-08
Attending: EMERGENCY MEDICINE
Payer: MEDICARE

## 2025-06-08 VITALS
HEART RATE: 65 BPM | SYSTOLIC BLOOD PRESSURE: 140 MMHG | OXYGEN SATURATION: 95 % | DIASTOLIC BLOOD PRESSURE: 66 MMHG | TEMPERATURE: 98.1 F | WEIGHT: 263.45 LBS | RESPIRATION RATE: 22 BRPM | BODY MASS INDEX: 43.89 KG/M2 | HEIGHT: 65 IN

## 2025-06-08 DIAGNOSIS — I71.012 DISSECTION OF DESCENDING THORACIC AORTA (HCC): Primary | ICD-10-CM

## 2025-06-08 DIAGNOSIS — I89.0 LYMPHEDEMA: Primary | ICD-10-CM

## 2025-06-08 PROBLEM — I71.00 AORTIC DISSECTION (HCC): Status: ACTIVE | Noted: 2025-06-08

## 2025-06-08 LAB
ABO + RH BLD: NORMAL
ALBUMIN SERPL-MCNC: 2.7 G/DL (ref 3.5–5)
ALBUMIN/GLOB SERPL: 0.7 (ref 1.1–2.2)
ALP SERPL-CCNC: 65 U/L (ref 45–117)
ALT SERPL-CCNC: 17 U/L (ref 12–78)
ANION GAP SERPL CALC-SCNC: 3 MMOL/L (ref 2–12)
APTT PPP: 29.4 SEC (ref 22.1–31)
AST SERPL W P-5'-P-CCNC: 15 U/L (ref 15–37)
BASOPHILS # BLD: 0.02 K/UL (ref 0–0.1)
BASOPHILS NFR BLD: 0.3 % (ref 0–1)
BILIRUB SERPL-MCNC: 0.4 MG/DL (ref 0.2–1)
BLOOD GROUP ANTIBODIES SERPL: NEGATIVE
BNP SERPL-MCNC: 113 PG/ML
BUN SERPL-MCNC: 21 MG/DL (ref 6–20)
BUN/CREAT SERPL: 23 (ref 12–20)
CA-I BLD-MCNC: 11.3 MG/DL (ref 8.5–10.1)
CHLORIDE SERPL-SCNC: 101 MMOL/L (ref 97–108)
CO2 SERPL-SCNC: 34 MMOL/L (ref 21–32)
CREAT SERPL-MCNC: 0.93 MG/DL (ref 0.55–1.02)
D DIMER PPP FEU-MCNC: 1.76 MG/L FEU (ref 0.19–0.5)
DIFFERENTIAL METHOD BLD: ABNORMAL
EOSINOPHIL # BLD: 0.35 K/UL (ref 0–0.4)
EOSINOPHIL NFR BLD: 6 % (ref 0–7)
ERYTHROCYTE [DISTWIDTH] IN BLOOD BY AUTOMATED COUNT: 17 % (ref 11.5–14.5)
GLOBULIN SER CALC-MCNC: 4.1 G/DL (ref 2–4)
GLUCOSE BLD STRIP.AUTO-MCNC: 88 MG/DL (ref 65–100)
GLUCOSE SERPL-MCNC: 89 MG/DL (ref 65–100)
HCT VFR BLD AUTO: 31.7 % (ref 35–47)
HGB BLD-MCNC: 9.7 G/DL (ref 11.5–16)
IMM GRANULOCYTES # BLD AUTO: 0.02 K/UL (ref 0–0.04)
IMM GRANULOCYTES NFR BLD AUTO: 0.3 % (ref 0–0.5)
INR PPP: 1.1 (ref 0.9–1.1)
LACTATE BLD-SCNC: 1.58 MMOL/L (ref 0.4–2)
LDH SERPL L TO P-CCNC: 261 U/L (ref 81–246)
LYMPHOCYTES # BLD: 1.9 K/UL (ref 0.8–3.5)
LYMPHOCYTES NFR BLD: 32.8 % (ref 12–49)
MCH RBC QN AUTO: 26.3 PG (ref 26–34)
MCHC RBC AUTO-ENTMCNC: 30.6 G/DL (ref 30–36.5)
MCV RBC AUTO: 85.9 FL (ref 80–99)
MONOCYTES # BLD: 0.78 K/UL (ref 0–1)
MONOCYTES NFR BLD: 13.4 % (ref 5–13)
NEUTS SEG # BLD: 2.74 K/UL (ref 1.8–8)
NEUTS SEG NFR BLD: 47.2 % (ref 32–75)
PERFORMED BY:: NORMAL
PERFORMED BY:: NORMAL
PLATELET # BLD AUTO: 297 K/UL (ref 150–400)
PMV BLD AUTO: 8.4 FL (ref 8.9–12.9)
POTASSIUM SERPL-SCNC: 3.9 MMOL/L (ref 3.5–5.1)
PROT SERPL-MCNC: 6.8 G/DL (ref 6.4–8.2)
PROTHROMBIN TIME: 10.5 SEC (ref 9.2–11.2)
RBC # BLD AUTO: 3.69 M/UL (ref 3.8–5.2)
RBC MORPH BLD: ABNORMAL
SODIUM SERPL-SCNC: 138 MMOL/L (ref 136–145)
SPECIMEN EXP DATE BLD: NORMAL
THERAPEUTIC RANGE: NORMAL SEC (ref 58–77)
TROPONIN I SERPL HS-MCNC: 6 NG/L (ref 0–51)
TROPONIN I SERPL HS-MCNC: 8 NG/L (ref 0–51)
WBC # BLD AUTO: 5.8 K/UL (ref 3.6–11)

## 2025-06-08 PROCEDURE — 71045 X-RAY EXAM CHEST 1 VIEW: CPT

## 2025-06-08 PROCEDURE — 85610 PROTHROMBIN TIME: CPT

## 2025-06-08 PROCEDURE — 80053 COMPREHEN METABOLIC PANEL: CPT

## 2025-06-08 PROCEDURE — 86901 BLOOD TYPING SEROLOGIC RH(D): CPT

## 2025-06-08 PROCEDURE — 84484 ASSAY OF TROPONIN QUANT: CPT

## 2025-06-08 PROCEDURE — 6370000000 HC RX 637 (ALT 250 FOR IP): Performed by: NURSE PRACTITIONER

## 2025-06-08 PROCEDURE — 82962 GLUCOSE BLOOD TEST: CPT

## 2025-06-08 PROCEDURE — 86850 RBC ANTIBODY SCREEN: CPT

## 2025-06-08 PROCEDURE — 6360000002 HC RX W HCPCS: Performed by: NURSE PRACTITIONER

## 2025-06-08 PROCEDURE — 96366 THER/PROPH/DIAG IV INF ADDON: CPT

## 2025-06-08 PROCEDURE — 83605 ASSAY OF LACTIC ACID: CPT

## 2025-06-08 PROCEDURE — 36415 COLL VENOUS BLD VENIPUNCTURE: CPT

## 2025-06-08 PROCEDURE — 85025 COMPLETE CBC W/AUTO DIFF WBC: CPT

## 2025-06-08 PROCEDURE — 2010000000 HC RM ICU SURGICAL

## 2025-06-08 PROCEDURE — 93005 ELECTROCARDIOGRAM TRACING: CPT | Performed by: EMERGENCY MEDICINE

## 2025-06-08 PROCEDURE — 96365 THER/PROPH/DIAG IV INF INIT: CPT

## 2025-06-08 PROCEDURE — 83880 ASSAY OF NATRIURETIC PEPTIDE: CPT

## 2025-06-08 PROCEDURE — 6360000004 HC RX CONTRAST MEDICATION: Performed by: NURSE PRACTITIONER

## 2025-06-08 PROCEDURE — 71275 CT ANGIOGRAPHY CHEST: CPT

## 2025-06-08 PROCEDURE — 6360000002 HC RX W HCPCS: Performed by: INTERNAL MEDICINE

## 2025-06-08 PROCEDURE — 86900 BLOOD TYPING SEROLOGIC ABO: CPT

## 2025-06-08 PROCEDURE — 83615 LACTATE (LD) (LDH) ENZYME: CPT

## 2025-06-08 PROCEDURE — 85730 THROMBOPLASTIN TIME PARTIAL: CPT

## 2025-06-08 PROCEDURE — 2500000003 HC RX 250 WO HCPCS: Performed by: INTERNAL MEDICINE

## 2025-06-08 PROCEDURE — 6370000000 HC RX 637 (ALT 250 FOR IP): Performed by: INTERNAL MEDICINE

## 2025-06-08 PROCEDURE — 99285 EMERGENCY DEPT VISIT HI MDM: CPT

## 2025-06-08 PROCEDURE — 85379 FIBRIN DEGRADATION QUANT: CPT

## 2025-06-08 RX ORDER — MAGNESIUM SULFATE IN WATER 40 MG/ML
2000 INJECTION, SOLUTION INTRAVENOUS PRN
Status: DISCONTINUED | OUTPATIENT
Start: 2025-06-08 | End: 2025-06-11 | Stop reason: HOSPADM

## 2025-06-08 RX ORDER — ESMOLOL HYDROCHLORIDE 10 MG/ML
25-300 INJECTION, SOLUTION INTRAVENOUS CONTINUOUS
Status: DISCONTINUED | OUTPATIENT
Start: 2025-06-08 | End: 2025-06-10

## 2025-06-08 RX ORDER — IPRATROPIUM BROMIDE AND ALBUTEROL SULFATE 2.5; .5 MG/3ML; MG/3ML
1 SOLUTION RESPIRATORY (INHALATION)
Status: COMPLETED | OUTPATIENT
Start: 2025-06-08 | End: 2025-06-08

## 2025-06-08 RX ORDER — ACETAMINOPHEN 650 MG/1
650 SUPPOSITORY RECTAL EVERY 6 HOURS PRN
Status: DISCONTINUED | OUTPATIENT
Start: 2025-06-08 | End: 2025-06-11 | Stop reason: HOSPADM

## 2025-06-08 RX ORDER — SODIUM CHLORIDE 0.9 % (FLUSH) 0.9 %
5-40 SYRINGE (ML) INJECTION EVERY 12 HOURS SCHEDULED
Status: DISCONTINUED | OUTPATIENT
Start: 2025-06-08 | End: 2025-06-11 | Stop reason: HOSPADM

## 2025-06-08 RX ORDER — ONDANSETRON 2 MG/ML
4 INJECTION INTRAMUSCULAR; INTRAVENOUS EVERY 6 HOURS PRN
Status: DISCONTINUED | OUTPATIENT
Start: 2025-06-08 | End: 2025-06-11 | Stop reason: HOSPADM

## 2025-06-08 RX ORDER — IOPAMIDOL 755 MG/ML
100 INJECTION, SOLUTION INTRAVASCULAR ONCE
Status: COMPLETED | OUTPATIENT
Start: 2025-06-08 | End: 2025-06-08

## 2025-06-08 RX ORDER — ONDANSETRON 4 MG/1
4 TABLET, ORALLY DISINTEGRATING ORAL EVERY 8 HOURS PRN
Status: DISCONTINUED | OUTPATIENT
Start: 2025-06-08 | End: 2025-06-11 | Stop reason: HOSPADM

## 2025-06-08 RX ORDER — FUROSEMIDE 20 MG/1
20 TABLET ORAL 2 TIMES DAILY
Status: DISCONTINUED | OUTPATIENT
Start: 2025-06-08 | End: 2025-06-09

## 2025-06-08 RX ORDER — SODIUM CHLORIDE 9 MG/ML
INJECTION, SOLUTION INTRAVENOUS PRN
Status: DISCONTINUED | OUTPATIENT
Start: 2025-06-08 | End: 2025-06-11 | Stop reason: HOSPADM

## 2025-06-08 RX ORDER — ACETAMINOPHEN 325 MG/1
650 TABLET ORAL EVERY 6 HOURS PRN
Status: DISCONTINUED | OUTPATIENT
Start: 2025-06-08 | End: 2025-06-11 | Stop reason: HOSPADM

## 2025-06-08 RX ORDER — TRAMADOL HYDROCHLORIDE 50 MG/1
50 TABLET ORAL EVERY 8 HOURS PRN
Status: DISCONTINUED | OUTPATIENT
Start: 2025-06-08 | End: 2025-06-11 | Stop reason: HOSPADM

## 2025-06-08 RX ORDER — OXYCODONE HYDROCHLORIDE 5 MG/1
5 TABLET ORAL EVERY 4 HOURS PRN
Refills: 0 | Status: DISCONTINUED | OUTPATIENT
Start: 2025-06-08 | End: 2025-06-11 | Stop reason: HOSPADM

## 2025-06-08 RX ORDER — ESMOLOL HYDROCHLORIDE 10 MG/ML
25-300 INJECTION, SOLUTION INTRAVENOUS CONTINUOUS
Status: DISCONTINUED | OUTPATIENT
Start: 2025-06-08 | End: 2025-06-08 | Stop reason: HOSPADM

## 2025-06-08 RX ORDER — POTASSIUM CHLORIDE 7.45 MG/ML
10 INJECTION INTRAVENOUS PRN
Status: DISCONTINUED | OUTPATIENT
Start: 2025-06-08 | End: 2025-06-11 | Stop reason: HOSPADM

## 2025-06-08 RX ORDER — POTASSIUM CHLORIDE 29.8 MG/ML
20 INJECTION INTRAVENOUS PRN
Status: DISCONTINUED | OUTPATIENT
Start: 2025-06-08 | End: 2025-06-11 | Stop reason: HOSPADM

## 2025-06-08 RX ORDER — VALSARTAN 160 MG/1
320 TABLET ORAL DAILY
Status: DISCONTINUED | OUTPATIENT
Start: 2025-06-08 | End: 2025-06-11 | Stop reason: HOSPADM

## 2025-06-08 RX ORDER — POLYETHYLENE GLYCOL 3350 17 G/17G
17 POWDER, FOR SOLUTION ORAL DAILY PRN
Status: DISCONTINUED | OUTPATIENT
Start: 2025-06-08 | End: 2025-06-11 | Stop reason: HOSPADM

## 2025-06-08 RX ORDER — SODIUM CHLORIDE 0.9 % (FLUSH) 0.9 %
5-40 SYRINGE (ML) INJECTION PRN
Status: DISCONTINUED | OUTPATIENT
Start: 2025-06-08 | End: 2025-06-11 | Stop reason: HOSPADM

## 2025-06-08 RX ADMIN — IPRATROPIUM BROMIDE AND ALBUTEROL SULFATE 1 DOSE: .5; 2.5 SOLUTION RESPIRATORY (INHALATION) at 12:44

## 2025-06-08 RX ADMIN — IOPAMIDOL 100 ML: 755 INJECTION, SOLUTION INTRAVENOUS at 13:36

## 2025-06-08 RX ADMIN — ESMOLOL HYDROCHLORIDE 150 MCG/KG/MIN: 10 INJECTION INTRAVENOUS at 18:12

## 2025-06-08 RX ADMIN — ESMOLOL HYDROCHLORIDE 200 MCG/KG/MIN: 10 INJECTION INTRAVENOUS at 18:33

## 2025-06-08 RX ADMIN — VALSARTAN 320 MG: 160 TABLET, FILM COATED ORAL at 20:24

## 2025-06-08 RX ADMIN — ESMOLOL HYDROCHLORIDE 175 MCG/KG/MIN: 10 INJECTION INTRAVENOUS at 23:05

## 2025-06-08 RX ADMIN — SODIUM CHLORIDE, PRESERVATIVE FREE 10 ML: 5 INJECTION INTRAVENOUS at 20:25

## 2025-06-08 RX ADMIN — ESMOLOL HYDROCHLORIDE 175 MCG/KG/MIN: 10 INJECTION INTRAVENOUS at 22:00

## 2025-06-08 RX ADMIN — ESMOLOL HYDROCHLORIDE 200 MCG/KG/MIN: 10 INJECTION INTRAVENOUS at 20:23

## 2025-06-08 RX ADMIN — ESMOLOL HYDROCHLORIDE 25 MCG/KG/MIN: 10 INJECTION INTRAVENOUS at 14:57

## 2025-06-08 RX ADMIN — FUROSEMIDE 20 MG: 20 TABLET ORAL at 20:24

## 2025-06-08 ASSESSMENT — PAIN - FUNCTIONAL ASSESSMENT: PAIN_FUNCTIONAL_ASSESSMENT: NONE - DENIES PAIN

## 2025-06-08 NOTE — H&P
[facility]    ICU Team Consult Note/History and Physical    Name: Daina Soto   : 1950   MRN: [id number]   Date: 2025      Reason for ICU Admission: Type B aortic dissection    HPI     75-year-old female past medical history of smoking for over 25 years, hypertension, dyslipidemia, chronic bilateral lower extremity lymphedema, gout who presented to the ED with chief complaint of sudden onset chest pain.  In addition she complained of progressive shortness of breath.  In addition she reports some dizziness.  Reports chronic headaches.    Denies headaches, lightheadedness, visual changes, neck pain, numbness or tingling, nausea, vomiting, diarrhea, abdominal pain, constipation. Denies history of similar occurrence/pain.    ED/pre ICU course:  Chest x-ray with some concern for widened mediastinum, noted to have elevated D-dimer, CT scan of the chest was obtained with findings of type B aortic dissection extending from the ostium of the left subclavian artery into the visualized abdominal aorta.    Post image finding case is discussed with vascular surgery who recommended blood pressure control.  Patient was transferred to [facility] for blood pressure stabilization.      Active Problem List:     [unfilled]    Past Medical History:      has a past medical history of Chronic neck and back pain, Essential hypertension, Gout, Hyperlipidemia, Knee pain, and Localized edema due to fluid overload.    Past Surgical History:      has a past surgical history that includes other surgical history; orthopedic surgery (Right); orthopedic surgery (Left); gyn; and gyn.    Home Medications:     Prior to Admission medications    Medication Sig Start Date End Date Taking? Authorizing Provider   valsartan-hydroCHLOROthiazide (DIOVAN-HCT) 320-25 MG per tablet TAKE 1 TABLET BY MOUTH EVERY DAY 25   Adore Arguello, APRN - CNP   predniSONE (DELTASONE) 20 MG tablet Take 1 tablet by mouth 2 times daily

## 2025-06-08 NOTE — ED PROVIDER NOTES
[facility] EMERGENCY DEPT  EMERGENCY DEPARTMENT HISTORY AND PHYSICAL EXAM      Date of evaluation: 6/8/2025  Patient Name: Daina Soto  Birthdate 1950  MRN: [id number]  ED Provider: KULDIP Hernandez CNP   Note Started: 11:44 AM EDT 6/8/25    HISTORY OF PRESENT ILLNESS     Chief Complaint   Patient presents with    Shortness of Breath    Dizziness       History Provided By: Patient, spouse     HPI: Daina Soto is a 75 y.o. female with known past medical history of hypertension, hyperlipidemia, chronic bilateral lower extremity lymphedema, gout who presented to the emergency department with sudden onset chest pain and shortness of breath that began around 830 this morning.  Patient states that when she woke up this morning noted that she was feeling more short of breath and initially had subsided.  However as the day progressed she continued to get more and more short of breath and was having more chest pain therefore she decided to come to the emergency department.  She states that she was a former smoker, smoked about 25 years ago.  Denies any current smoking, alcohol, or drug use.  States that she takes blood pressure medication, fluid pills, and medication for her gout.  She denies any headache, lightheadedness, visual changes, neck pain, numbness or tingling, weakness, she does endorse some midsternal chest pain, mild abdominal pain, but denies nausea, vomiting, diarrhea, or constipation.  She does have chronic changes to the bilateral lower extremities associated with her lymphedema.  States that swelling is about baseline.She does report some intermittent dizziness as well, but denies feeling lightheaded or faint.   She also reports past surgical history of C-sections, right total knee replacement, she has had 2 left-sided rotator cuff surgeries on the left shoulder and states that there is a \"patch\" that is implanted and states that is very uncomfortable to have blood pressure taken on that side.  She also

## 2025-06-08 NOTE — ED NOTES
Provider at bedside, informed pt and family of CT scan results and plan for transfer to higher level of care.

## 2025-06-08 NOTE — ED NOTES
Pt assisted OOB to bedside toilet. Voided approximated 400 ml of clear yellow urine. Pt is less anxious, states dizziness has improved at this time. States breathing seems easier.

## 2025-06-08 NOTE — ED NOTES
Family at bedside. Pt c/o air hunger and dizziness. Pt is anxious. VS stable. Provider aware of c/o. HOB elevated for comfort.

## 2025-06-08 NOTE — ED TRIAGE NOTES
States when she woke up this morning she felt like she couldn't breathy easy. C/o not getting enough oxygen in. Denies pain, states \"something is wrong\". C/o dizziness.

## 2025-06-09 LAB
ALBUMIN SERPL-MCNC: 2.4 G/DL (ref 3.5–5)
ALBUMIN/GLOB SERPL: 0.6 (ref 1.1–2.2)
ALP SERPL-CCNC: 62 U/L (ref 45–117)
ALT SERPL-CCNC: 13 U/L (ref 12–78)
ANION GAP SERPL CALC-SCNC: 3 MMOL/L (ref 2–12)
APTT PPP: 32.3 SEC (ref 22.1–31)
AST SERPL-CCNC: 15 U/L (ref 15–37)
BASOPHILS # BLD: 0.02 K/UL (ref 0–0.1)
BASOPHILS NFR BLD: 0.4 % (ref 0–1)
BILIRUB SERPL-MCNC: 0.5 MG/DL (ref 0.2–1)
BUN SERPL-MCNC: 16 MG/DL (ref 6–20)
BUN/CREAT SERPL: 21 (ref 12–20)
CALCIUM SERPL-MCNC: 11 MG/DL (ref 8.5–10.1)
CHLORIDE SERPL-SCNC: 104 MMOL/L (ref 97–108)
CO2 SERPL-SCNC: 28 MMOL/L (ref 21–32)
CREAT SERPL-MCNC: 0.77 MG/DL (ref 0.55–1.02)
DIFFERENTIAL METHOD BLD: ABNORMAL
EOSINOPHIL # BLD: 0.21 K/UL (ref 0–0.4)
EOSINOPHIL NFR BLD: 4.5 % (ref 0–7)
ERYTHROCYTE [DISTWIDTH] IN BLOOD BY AUTOMATED COUNT: 17 % (ref 11.5–14.5)
GLOBULIN SER CALC-MCNC: 4.3 G/DL (ref 2–4)
GLUCOSE SERPL-MCNC: 80 MG/DL (ref 65–100)
HCT VFR BLD AUTO: 32 % (ref 35–47)
HGB BLD-MCNC: 9.6 G/DL (ref 11.5–16)
IMM GRANULOCYTES # BLD AUTO: 0.01 K/UL (ref 0–0.04)
IMM GRANULOCYTES NFR BLD AUTO: 0.2 % (ref 0–0.5)
INR PPP: 1.1 (ref 0.9–1.1)
LYMPHOCYTES # BLD: 1.08 K/UL (ref 0.8–3.5)
LYMPHOCYTES NFR BLD: 23 % (ref 12–49)
MCH RBC QN AUTO: 26.2 PG (ref 26–34)
MCHC RBC AUTO-ENTMCNC: 30 G/DL (ref 30–36.5)
MCV RBC AUTO: 87.2 FL (ref 80–99)
MONOCYTES # BLD: 0.63 K/UL (ref 0–1)
MONOCYTES NFR BLD: 13.4 % (ref 5–13)
NEUTS SEG # BLD: 2.75 K/UL (ref 1.8–8)
NEUTS SEG NFR BLD: 58.5 % (ref 32–75)
NRBC # BLD: 0 K/UL (ref 0–0.01)
NRBC BLD-RTO: 0 PER 100 WBC
PHOSPHATE SERPL-MCNC: 2.7 MG/DL (ref 2.6–4.7)
PLATELET # BLD AUTO: 301 K/UL (ref 150–400)
PMV BLD AUTO: 9.1 FL (ref 8.9–12.9)
POTASSIUM SERPL-SCNC: 3.7 MMOL/L (ref 3.5–5.1)
PROT SERPL-MCNC: 6.7 G/DL (ref 6.4–8.2)
PROTHROMBIN TIME: 11.6 SEC (ref 9.2–11.2)
RBC # BLD AUTO: 3.67 M/UL (ref 3.8–5.2)
SODIUM SERPL-SCNC: 135 MMOL/L (ref 136–145)
THERAPEUTIC RANGE: ABNORMAL SECS (ref 58–77)
WBC # BLD AUTO: 4.7 K/UL (ref 3.6–11)

## 2025-06-09 PROCEDURE — 6370000000 HC RX 637 (ALT 250 FOR IP): Performed by: INTERNAL MEDICINE

## 2025-06-09 PROCEDURE — 2700000000 HC OXYGEN THERAPY PER DAY

## 2025-06-09 PROCEDURE — 84100 ASSAY OF PHOSPHORUS: CPT

## 2025-06-09 PROCEDURE — 85025 COMPLETE CBC W/AUTO DIFF WBC: CPT

## 2025-06-09 PROCEDURE — 80053 COMPREHEN METABOLIC PANEL: CPT

## 2025-06-09 PROCEDURE — 2500000003 HC RX 250 WO HCPCS: Performed by: INTERNAL MEDICINE

## 2025-06-09 PROCEDURE — 6360000002 HC RX W HCPCS: Performed by: INTERNAL MEDICINE

## 2025-06-09 PROCEDURE — 85610 PROTHROMBIN TIME: CPT

## 2025-06-09 PROCEDURE — 85730 THROMBOPLASTIN TIME PARTIAL: CPT

## 2025-06-09 PROCEDURE — 2500000003 HC RX 250 WO HCPCS

## 2025-06-09 PROCEDURE — 2010000000 HC RM ICU SURGICAL

## 2025-06-09 RX ORDER — LABETALOL HYDROCHLORIDE 5 MG/ML
20 INJECTION, SOLUTION INTRAVENOUS EVERY 6 HOURS PRN
Status: DISCONTINUED | OUTPATIENT
Start: 2025-06-09 | End: 2025-06-11 | Stop reason: HOSPADM

## 2025-06-09 RX ORDER — DEXMEDETOMIDINE HYDROCHLORIDE 4 UG/ML
INJECTION, SOLUTION INTRAVENOUS
Status: COMPLETED
Start: 2025-06-09 | End: 2025-06-09

## 2025-06-09 RX ORDER — DEXMEDETOMIDINE HYDROCHLORIDE 4 UG/ML
.1-1.5 INJECTION, SOLUTION INTRAVENOUS CONTINUOUS
Status: DISCONTINUED | OUTPATIENT
Start: 2025-06-09 | End: 2025-06-10

## 2025-06-09 RX ORDER — HYDROCHLOROTHIAZIDE 25 MG/1
25 TABLET ORAL DAILY
Status: DISCONTINUED | OUTPATIENT
Start: 2025-06-09 | End: 2025-06-11 | Stop reason: HOSPADM

## 2025-06-09 RX ORDER — VERAPAMIL HYDROCHLORIDE 240 MG/1
240 TABLET, FILM COATED, EXTENDED RELEASE ORAL DAILY
Status: DISCONTINUED | OUTPATIENT
Start: 2025-06-09 | End: 2025-06-11 | Stop reason: HOSPADM

## 2025-06-09 RX ORDER — FUROSEMIDE 20 MG/1
20 TABLET ORAL DAILY
Status: DISCONTINUED | OUTPATIENT
Start: 2025-06-09 | End: 2025-06-11 | Stop reason: HOSPADM

## 2025-06-09 RX ADMIN — SODIUM CHLORIDE, PRESERVATIVE FREE 10 ML: 5 INJECTION INTRAVENOUS at 09:20

## 2025-06-09 RX ADMIN — ESMOLOL HYDROCHLORIDE 75 MCG/KG/MIN: 10 INJECTION INTRAVENOUS at 09:20

## 2025-06-09 RX ADMIN — TRAMADOL HYDROCHLORIDE 50 MG: 50 TABLET, COATED ORAL at 15:43

## 2025-06-09 RX ADMIN — ESMOLOL HYDROCHLORIDE 225 MCG/KG/MIN: 10 INJECTION INTRAVENOUS at 12:40

## 2025-06-09 RX ADMIN — DEXMEDETOMIDINE HYDROCHLORIDE 0.3 MCG/KG/HR: 400 INJECTION, SOLUTION INTRAVENOUS at 00:59

## 2025-06-09 RX ADMIN — LABETALOL HYDROCHLORIDE 20 MG: 5 INJECTION, SOLUTION INTRAVENOUS at 22:42

## 2025-06-09 RX ADMIN — TRAMADOL HYDROCHLORIDE 50 MG: 50 TABLET, COATED ORAL at 23:32

## 2025-06-09 RX ADMIN — VERAPAMIL HYDROCHLORIDE 240 MG: 240 TABLET, FILM COATED, EXTENDED RELEASE ORAL at 11:38

## 2025-06-09 RX ADMIN — SODIUM CHLORIDE, PRESERVATIVE FREE 10 ML: 5 INJECTION INTRAVENOUS at 22:42

## 2025-06-09 RX ADMIN — LABETALOL HYDROCHLORIDE 20 MG: 5 INJECTION, SOLUTION INTRAVENOUS at 12:35

## 2025-06-09 RX ADMIN — VALSARTAN 320 MG: 160 TABLET, FILM COATED ORAL at 08:41

## 2025-06-09 RX ADMIN — FUROSEMIDE 20 MG: 20 TABLET ORAL at 13:54

## 2025-06-09 RX ADMIN — HYDROCHLOROTHIAZIDE 25 MG: 25 TABLET ORAL at 12:59

## 2025-06-09 RX ADMIN — TRAMADOL HYDROCHLORIDE 50 MG: 50 TABLET, COATED ORAL at 04:26

## 2025-06-09 ASSESSMENT — PAIN DESCRIPTION - ORIENTATION: ORIENTATION: RIGHT

## 2025-06-09 ASSESSMENT — PAIN DESCRIPTION - DESCRIPTORS: DESCRIPTORS: SORE

## 2025-06-09 ASSESSMENT — PAIN SCALES - GENERAL
PAINLEVEL_OUTOF10: 4
PAINLEVEL_OUTOF10: 5

## 2025-06-09 ASSESSMENT — PAIN DESCRIPTION - LOCATION
LOCATION: FLANK
LOCATION: HIP

## 2025-06-09 NOTE — PLAN OF CARE
Problem: Skin/Tissue Integrity  Goal: Skin integrity remains intact  Description: 1.  Monitor for areas of redness and/or skin breakdown2.  Assess vascular access sites hourly3.  Every 4-6 hours minimum:  Change oxygen saturation probe site4.  Every 4-6 hours:  If on nasal continuous positive airway pressure, respiratory therapy assess nares and determine need for appliance change or resting period  Outcome: Progressing  Flowsheets  Taken 6/9/2025 0800 by Prisca Cordero RN  Skin Integrity Remains Intact: Monitor for areas of redness and/or skin breakdown  Taken 6/9/2025 0000 by Kendra Mistry RN  Skin Integrity Remains Intact:   Monitor for areas of redness and/or skin breakdown   Assess vascular access sites hourly  Taken 6/8/2025 2000 by Kendra Mistry RN  Skin Integrity Remains Intact:   Monitor for areas of redness and/or skin breakdown   Assess vascular access sites hourly   Every 4-6 hours minimum:  Change oxygen saturation probe site     Problem: ABCDS Injury Assessment  Goal: Absence of physical injury  Outcome: Progressing     Problem: Safety - Adult  Goal: Free from fall injury  Outcome: Progressing

## 2025-06-09 NOTE — CONSULTS
there does not appear to be severe edema.    LABORATORY DATA:  Lab values have been reviewed.  Her imaging is consistent with thoracic aortic dissection.  CT scan is of poor quality and does not go past the abdomen area.  It does show a compressed true lumen with visceral and renal arteries coming off both lumens, but everything appears to be getting well perfused.    IMPRESSION AND PLAN:  This is a 75-year-old morbidly obese, nonambulatory female with multiple medical issues, who is presented with presumably an acute aortic dissection.  I agree with current medical management and would recommend transferring to oral medications when feasible.  I would agree with a repeat CTA of the chest, abdomen, and pelvis tomorrow for a followup and to better visualize the abdomen and pelvis.  We will follow up with a CT scan tomorrow and see how she does.        AMBER MAJOR MD AM/BRANDEN  D:  06/09/2025 17:15:50  T:  06/09/2025 19:10:01  JOB #:  [id number]

## 2025-06-10 ENCOUNTER — APPOINTMENT (OUTPATIENT)
Facility: HOSPITAL | Age: 75
DRG: 300 | End: 2025-06-10
Attending: INTERNAL MEDICINE
Payer: MEDICARE

## 2025-06-10 LAB
ANION GAP SERPL CALC-SCNC: 6 MMOL/L (ref 2–12)
BUN SERPL-MCNC: 17 MG/DL (ref 6–20)
BUN/CREAT SERPL: 17 (ref 12–20)
CALCIUM SERPL-MCNC: 10.9 MG/DL (ref 8.5–10.1)
CHLORIDE SERPL-SCNC: 103 MMOL/L (ref 97–108)
CO2 SERPL-SCNC: 28 MMOL/L (ref 21–32)
CREAT SERPL-MCNC: 0.98 MG/DL (ref 0.55–1.02)
EKG ATRIAL RATE: 76 BPM
EKG DIAGNOSIS: NORMAL
EKG P AXIS: 99 DEGREES
EKG P-R INTERVAL: 150 MS
EKG Q-T INTERVAL: 438 MS
EKG QRS DURATION: 164 MS
EKG QTC CALCULATION (BAZETT): 492 MS
EKG R AXIS: -31 DEGREES
EKG T AXIS: 9 DEGREES
EKG VENTRICULAR RATE: 76 BPM
ERYTHROCYTE [DISTWIDTH] IN BLOOD BY AUTOMATED COUNT: 17.5 % (ref 11.5–14.5)
GLUCOSE SERPL-MCNC: 96 MG/DL (ref 65–100)
HCT VFR BLD AUTO: 31.2 % (ref 35–47)
HGB BLD-MCNC: 9.5 G/DL (ref 11.5–16)
MAGNESIUM SERPL-MCNC: 1.5 MG/DL (ref 1.6–2.4)
MCH RBC QN AUTO: 26.9 PG (ref 26–34)
MCHC RBC AUTO-ENTMCNC: 30.4 G/DL (ref 30–36.5)
MCV RBC AUTO: 88.4 FL (ref 80–99)
NRBC # BLD: 0 K/UL (ref 0–0.01)
NRBC BLD-RTO: 0 PER 100 WBC
PLATELET # BLD AUTO: 321 K/UL (ref 150–400)
PMV BLD AUTO: 9.7 FL (ref 8.9–12.9)
POTASSIUM SERPL-SCNC: 3.3 MMOL/L (ref 3.5–5.1)
RBC # BLD AUTO: 3.53 M/UL (ref 3.8–5.2)
SODIUM SERPL-SCNC: 137 MMOL/L (ref 136–145)
WBC # BLD AUTO: 4.8 K/UL (ref 3.6–11)

## 2025-06-10 PROCEDURE — 2500000003 HC RX 250 WO HCPCS: Performed by: INTERNAL MEDICINE

## 2025-06-10 PROCEDURE — 2060000000 HC ICU INTERMEDIATE R&B

## 2025-06-10 PROCEDURE — 6360000004 HC RX CONTRAST MEDICATION: Performed by: RADIOLOGY

## 2025-06-10 PROCEDURE — 2010000000 HC RM ICU SURGICAL

## 2025-06-10 PROCEDURE — 2500000003 HC RX 250 WO HCPCS: Performed by: ANESTHESIOLOGY

## 2025-06-10 PROCEDURE — 83735 ASSAY OF MAGNESIUM: CPT

## 2025-06-10 PROCEDURE — 6360000002 HC RX W HCPCS: Performed by: INTERNAL MEDICINE

## 2025-06-10 PROCEDURE — 80048 BASIC METABOLIC PNL TOTAL CA: CPT

## 2025-06-10 PROCEDURE — 71275 CT ANGIOGRAPHY CHEST: CPT

## 2025-06-10 PROCEDURE — 85027 COMPLETE CBC AUTOMATED: CPT

## 2025-06-10 PROCEDURE — 6370000000 HC RX 637 (ALT 250 FOR IP): Performed by: INTERNAL MEDICINE

## 2025-06-10 RX ORDER — IPRATROPIUM BROMIDE AND ALBUTEROL SULFATE 2.5; .5 MG/3ML; MG/3ML
1 SOLUTION RESPIRATORY (INHALATION) EVERY 4 HOURS PRN
Status: DISCONTINUED | OUTPATIENT
Start: 2025-06-10 | End: 2025-06-11 | Stop reason: HOSPADM

## 2025-06-10 RX ORDER — MAGNESIUM SULFATE IN WATER 40 MG/ML
2000 INJECTION, SOLUTION INTRAVENOUS ONCE
Status: COMPLETED | OUTPATIENT
Start: 2025-06-10 | End: 2025-06-10

## 2025-06-10 RX ORDER — IOPAMIDOL 755 MG/ML
100 INJECTION, SOLUTION INTRAVASCULAR
Status: COMPLETED | OUTPATIENT
Start: 2025-06-10 | End: 2025-06-10

## 2025-06-10 RX ORDER — CARVEDILOL 6.25 MG/1
6.25 TABLET ORAL 2 TIMES DAILY WITH MEALS
Status: CANCELLED | OUTPATIENT
Start: 2025-06-10

## 2025-06-10 RX ORDER — IOPAMIDOL 755 MG/ML
100 INJECTION, SOLUTION INTRAVASCULAR
Status: DISCONTINUED | OUTPATIENT
Start: 2025-06-10 | End: 2025-06-11 | Stop reason: HOSPADM

## 2025-06-10 RX ORDER — TRAZODONE HYDROCHLORIDE 50 MG/1
50 TABLET ORAL NIGHTLY PRN
Status: DISCONTINUED | OUTPATIENT
Start: 2025-06-10 | End: 2025-06-11 | Stop reason: HOSPADM

## 2025-06-10 RX ORDER — POTASSIUM CHLORIDE 750 MG/1
40 TABLET, EXTENDED RELEASE ORAL ONCE
Status: COMPLETED | OUTPATIENT
Start: 2025-06-10 | End: 2025-06-10

## 2025-06-10 RX ADMIN — DEXMEDETOMIDINE HYDROCHLORIDE 0.7 MCG/KG/HR: 400 INJECTION, SOLUTION INTRAVENOUS at 15:20

## 2025-06-10 RX ADMIN — FUROSEMIDE 20 MG: 20 TABLET ORAL at 08:26

## 2025-06-10 RX ADMIN — HYDROCHLOROTHIAZIDE 25 MG: 25 TABLET ORAL at 08:26

## 2025-06-10 RX ADMIN — POTASSIUM CHLORIDE 40 MEQ: 750 TABLET, FILM COATED, EXTENDED RELEASE ORAL at 10:37

## 2025-06-10 RX ADMIN — IOPAMIDOL 100 ML: 755 INJECTION, SOLUTION INTRAVENOUS at 14:39

## 2025-06-10 RX ADMIN — SODIUM CHLORIDE, PRESERVATIVE FREE 10 ML: 5 INJECTION INTRAVENOUS at 10:40

## 2025-06-10 RX ADMIN — MAGNESIUM SULFATE HEPTAHYDRATE 2000 MG: 40 INJECTION, SOLUTION INTRAVENOUS at 17:45

## 2025-06-10 RX ADMIN — DEXMEDETOMIDINE HYDROCHLORIDE 0.5 MCG/KG/HR: 400 INJECTION, SOLUTION INTRAVENOUS at 00:38

## 2025-06-10 RX ADMIN — VALSARTAN 320 MG: 160 TABLET, FILM COATED ORAL at 08:26

## 2025-06-10 RX ADMIN — SODIUM CHLORIDE, PRESERVATIVE FREE 10 ML: 5 INJECTION INTRAVENOUS at 21:27

## 2025-06-10 RX ADMIN — VERAPAMIL HYDROCHLORIDE 240 MG: 240 TABLET, FILM COATED, EXTENDED RELEASE ORAL at 08:26

## 2025-06-10 ASSESSMENT — PAIN SCALES - GENERAL
PAINLEVEL_OUTOF10: 2
PAINLEVEL_OUTOF10: 3

## 2025-06-10 NOTE — WOUND CARE
Wound Care Note:     New consult placed by nurse request for bilateral buttock wounds    Chart shows:  Admitted for aortic dissection  Past Medical History:   Diagnosis Date    Chronic neck and back pain     Essential hypertension 6/25/2020    Gout 6/25/2020    Hyperlipidemia 6/25/2020    Knee pain 6/25/2020    Localized edema due to fluid overload 6/25/2020     WBC = 4.8 on 6/10/25  Mahin = 15  Admitted from [place] [facility]    Assessment:   Patient is A&O x 4, communicative, continent with moderate assistance needed in repositioning.    [facility]  Patient has a Pure Wick in place.    Diet: Adult diet regular; 3 carb choices; low sodium  Patient reports no pain.      Bilateral heels,right buttocks, and sacral skin intact and without erythema.    1. POA left buttock with moisture wound approximately 0.7 cm x 0.7 cm x 0.1 cm, wound bed is pink, blanches, wound edges are open, berhane-wound with hyperpigmentation.    2.  POA right buttock with hypopigmentation from previous wounds.  Left open to air.    Patient repositioned on left side.  Heels offloaded on pillows.     Recommendations:    Left buttock- Daily and as needed apply Triad.    Skin Care & Pressure Prevention:  Minimize layers of linen/pads under patient to optimize support surface.    Turn/reposition approximately every 2 hours and offload heels.  Manage incontinence / promote continence   Nourishing Skin Cream to dry skin, minimize use of briefs when able    Discussed above plan with patient & MORRIS Mitchell    Transition of Care: Wound care will sign off.      Cinthia \"Virginia\" YOAN Zhoa, RN, CWON  Certified Wound and Ostomy Nurse  office [phone]  Best way to contact me is through [employer]

## 2025-06-11 ENCOUNTER — APPOINTMENT (OUTPATIENT)
Facility: HOSPITAL | Age: 75
DRG: 300 | End: 2025-06-11
Attending: INTERNAL MEDICINE
Payer: MEDICARE

## 2025-06-11 VITALS
WEIGHT: 252.4 LBS | SYSTOLIC BLOOD PRESSURE: 130 MMHG | TEMPERATURE: 98.4 F | RESPIRATION RATE: 21 BRPM | OXYGEN SATURATION: 92 % | BODY MASS INDEX: 42 KG/M2 | DIASTOLIC BLOOD PRESSURE: 71 MMHG | HEART RATE: 63 BPM

## 2025-06-11 PROCEDURE — 97535 SELF CARE MNGMENT TRAINING: CPT

## 2025-06-11 PROCEDURE — 6360000002 HC RX W HCPCS: Performed by: INTERNAL MEDICINE

## 2025-06-11 PROCEDURE — 97116 GAIT TRAINING THERAPY: CPT

## 2025-06-11 PROCEDURE — 6370000000 HC RX 637 (ALT 250 FOR IP): Performed by: HOSPITALIST

## 2025-06-11 PROCEDURE — 6370000000 HC RX 637 (ALT 250 FOR IP): Performed by: INTERNAL MEDICINE

## 2025-06-11 PROCEDURE — 71045 X-RAY EXAM CHEST 1 VIEW: CPT

## 2025-06-11 PROCEDURE — 97161 PT EVAL LOW COMPLEX 20 MIN: CPT

## 2025-06-11 PROCEDURE — 97165 OT EVAL LOW COMPLEX 30 MIN: CPT

## 2025-06-11 PROCEDURE — 97530 THERAPEUTIC ACTIVITIES: CPT

## 2025-06-11 RX ADMIN — VERAPAMIL HYDROCHLORIDE 240 MG: 240 TABLET, FILM COATED, EXTENDED RELEASE ORAL at 08:28

## 2025-06-11 RX ADMIN — HYDROCHLOROTHIAZIDE 25 MG: 25 TABLET ORAL at 08:28

## 2025-06-11 RX ADMIN — TRAMADOL HYDROCHLORIDE 50 MG: 50 TABLET, COATED ORAL at 12:28

## 2025-06-11 RX ADMIN — POTASSIUM CHLORIDE 10 MEQ: 7.46 INJECTION, SOLUTION INTRAVENOUS at 06:40

## 2025-06-11 RX ADMIN — MAGNESIUM HYDROXIDE 30 ML: 400 SUSPENSION ORAL at 09:40

## 2025-06-11 RX ADMIN — FUROSEMIDE 20 MG: 20 TABLET ORAL at 08:28

## 2025-06-11 RX ADMIN — POTASSIUM CHLORIDE 10 MEQ: 7.46 INJECTION, SOLUTION INTRAVENOUS at 08:53

## 2025-06-11 RX ADMIN — VALSARTAN 320 MG: 160 TABLET, FILM COATED ORAL at 08:28

## 2025-06-11 ASSESSMENT — PAIN DESCRIPTION - LOCATION: LOCATION: CHEST

## 2025-06-11 ASSESSMENT — PAIN SCALES - GENERAL
PAINLEVEL_OUTOF10: 5
PAINLEVEL_OUTOF10: 5

## 2025-06-11 NOTE — CARE COORDINATION
Layout One level   Home Access Stairs to enter with rails   Entrance Stairs - Number of Steps 3 at back door   Entrance Stairs - Rails Left   Bathroom Shower/Tub Tub/Shower unit   Bathroom Equipment Tub transfer bench;Grab bars in shower   Home Equipment Cane;Walker - Rolling;Wheelchair - Manual   Receives Help From Family   Prior Level of Assist for ADLs Needs assistance   Prior Level of Assist for Homemaking Needs assistance   Ambulation Assistance Needs assistance   Prior Level of Assist for Transfers Independent   Occupation Retired   Discharge Planning   Type of Residence House   Living Arrangements Spouse/Significant Other   Condition of Participation: Discharge Planning   The Plan for Transition of Care is related to the following treatment goals: Discharge planning   The Patient and/or Patient Representative was provided with a Choice of Provider? Patient   The Patient and/Or Patient Representative agree with the Discharge Plan? Yes   Freedom of Choice list was provided with basic dialogue that supports the patient's individualized plan of care/goals, treatment preferences, and shares the quality data associated with the providers?  Yes     JAZZ Ramirez CCM  Care Management   Available on [employer] or [phone]   
midnight 6/10.  JOSH: Medically ready today  If SNF or IPR: Date FOC offered: 6/11  Date FOC received:   Accepting facility: Pending  Date authorization started with reference number: NA  Follow up appointments: Defer to AVS  DME needed: Defer to rehab  Transportation at discharge: [facility]  IM/IMM Medicare/ letter given:   Caregiver Contact: , Willie Soto [phone]  Discharge Caregiver contacted prior to discharge?   Care Conference needed?   Barriers to discharge:  SNF choice and SNF bed    Rec'd consult for SNF placement from provider.  Met w patient at bedside while husb was on Facetime to discuss recommendation for SNF and provide education on this level of care.  Family would like to review list and CM will follow up shortly for choice.  Will send referrals once therapy evals are in.    Update 2:30pm: Met w family to f/u on SNF choice and they request [facility] and didn't want to make a 2nd choice until [facility] responds.  Dr. Thrasher also confirms patient is ready for d/c.  PT eval is in and waiting on OT eval.    JAZZ Ramirez CCM  Care Management   Available on [employer] or [phone]

## 2025-06-11 NOTE — DISCHARGE SUMMARY
Discharge Summary       PATIENT ID: Daina Soto  MRN: [id number]   YOB: 1950    DATE OF ADMISSION: 6/8/2025  5:48 PM    DATE OF DISCHARGE: 6/11/2025   PRIMARY CARE PROVIDER: Adore Arguello APRN - CNP     ATTENDING PHYSICIAN: Dr Moustapha Thrasher  DISCHARGING PROVIDER: Moustapha Thrasher MD    To contact this individual call [phone] and ask the  to page.  If unavailable ask to be transferred the Adult Hospitalist Department.    CONSULTATIONS: IP CONSULT TO CASE MANAGEMENT    PROCEDURES/SURGERIES: * No surgery found *    ADMITTING DIAGNOSES & HOSPITAL COURSE:   Type B aortic dissection:   Chest pain--resolved  -Repeat CT scan shows stability of the dissection.  Will continue medical management as advised by vascular surgery team already on board.  -Consult Cardiology     Essential hypertension: Will continue Diovan, verapamil and hydrochlorothiazide.  Will monitor blood pressure closely.     Obesity class III: Patient has BMI of 43.88.  Patient will benefit from lifestyle modification including diet and exercise to promote weight loss when feasible.  Will check TSH level.     Anemia: Most likely secondary to chronic disease.  Will carry out anemia workup to evaluate the patient for other possible causes of anemia including checking stool guaiac to rule out occult GI bleed.     Bilateral lower extremity lymphedema: Dopplers. Follows up with lymphedema clinic     Hypomagnesemia : This was repleted.  Will repeat magnesium level.     Hypokalemia: This was also repleted.  Will repeat potassium level      PENDING TEST RESULTS:   At the time of discharge the following test results are still pending: none    FOLLOW UP APPOINTMENTS:    PCP  Vascular surgery    ADDITIONAL CARE RECOMMENDATIONS: as above    DIET: cardiac diet    ACTIVITY: activity as tolerated      DISCHARGE MEDICATIONS:     Medication List        CONTINUE taking these medications      allopurinol 100 MG tablet  Commonly known as:

## 2025-06-11 NOTE — PLAN OF CARE
Problem: Occupational Therapy - Adult  Goal: By Discharge: Performs self-care activities at highest level of function for planned discharge setting.  See evaluation for individualized goals.  Description: FUNCTIONAL STATUS PRIOR TO ADMISSION: Patient lives with  who provides varying assistance with ADLs and assists with all IADLs.     HOME SUPPORT: Patient lived with supportive  in a one level home.    Occupational Therapy Goals:  Initiated 6/11/2025  1.  Patient will perform grooming seated with Stand by Assist within 7 day(s).  2.  Patient will perform upper body dressing seated with Stand by Assist within 7 day(s).  3.  Patient will perform lower body bathing seated with Minimal Assist within 7 day(s).  4.  Patient will perform toilet transfers with Contact Guard Assist and DME PRN within 7 day(s).  5.  Patient will perform all aspects of toileting with Minimal Assist within 7 day(s).  6.  Patient will participate in upper extremity therapeutic exercise/activities with Stand by Assist for 5 minutes within 7 day(s).    7.  Patient will utilize energy conservation techniques during functional activities with verbal cues within 7 day(s).    Outcome: Progressing     OCCUPATIONAL THERAPY EVALUATION    Patient: Daina Soto (75 y.o. female)  Date: 6/11/2025  Primary Diagnosis: Aortic dissection (HCC) [I71.00]         Precautions: Fall Risk, Other (Comment) (HR goal <60bpm; SBP goal 100-120)                  ASSESSMENT :  The patient is limited by decreased functional mobility, independence in ADLs, high-level IADLs, ROM, strength, body mechanics, activity tolerance, endurance, safety awareness, cognition, coordination, balance, following admission for chest pain and revealed aortic dissection .    Pt received semi-reclined in chair, amendable to session, on RA. See flow sheet below for VS during session - noted elevated BP/HR throughout activity and RN cleared for activity. Required Antonio x 2 to modA

## 2025-06-11 NOTE — PLAN OF CARE
Problem: Physical Therapy - Adult  Goal: By Discharge: Performs mobility at highest level of function for planned discharge setting.  See evaluation for individualized goals.  Description: FUNCTIONAL STATUS PRIOR TO ADMISSION: Patient was modified independent using a rollator for functional mobility. Hx falls. Lives with  who is able to provide assist and does assist patient for some transfers/ bathroom mobility. Retired LPN.     HOME SUPPORT PRIOR TO ADMISSION: The patient lived with  and required minimal assistance for bathroom mobility.    Physical Therapy Goals  Initiated 6/11/2025  1.  Patient will move from supine to sit and sit to supine in bed with minimal assistance within 7 day(s).    2.  Patient will perform sit to stand with contact guard assist within 7 day(s).  3.  Patient will transfer from bed to chair and chair to bed with contact guard assist using the least restrictive device within 7 day(s).  4.  Patient will ambulate with contact guard assist for 30 feet with the least restrictive device within 7 day(s).   5.  Patient will ascend/descend 3 stairs with right handrail(s) with minimal assistance within 7 day(s).    Outcome: Progressing   PHYSICAL THERAPY EVALUATION    Patient: Daina Soto (75 y.o. female)  Date: 6/11/2025  Primary Diagnosis: Aortic dissection (HCC) [I71.00]       Precautions: Restrictions/Precautions  Restrictions/Precautions: Fall Risk, Other (Comment) (HR goal <60bpm; SBP goal 100-120)            ASSESSMENT :   DEFICITS/IMPAIRMENTS:   The patient is limited by decreased functional mobility, independence in ADLs, strength, activity tolerance, safety awareness, balance, and increased risk for falls in setting of hospital admission for acute type B aortic dissection. Per vascular, planning for medical management with HR goal <60 bpm and SBP goal 100-120. Patient received seated in chair on RA, alert, and agreeable to PT. HR and BP not at goal throughout session

## 2025-06-11 NOTE — DISCHARGE INSTRUCTIONS
Discharge Instructions       PATIENT ID: Daina Soto  MRN: [id number]   YOB: 1950    DATE OF ADMISSION: [unfilled]    DATE OF DISCHARGE: 6/11/2025    PRIMARY CARE PROVIDER: @PCP@     ATTENDING PHYSICIAN: [unfilled]  DISCHARGING PROVIDER: Moustapha Thrasher MD    To contact this individual call [phone] and ask the  to page.   If unavailable ask to be transferred the Adult Hospitalist Department.    DISCHARGE DIAGNOSES Aortic aneurysm    CONSULTATIONS: Vascular surgery    PROCEDURES/SURGERIES: * No surgery found *    PENDING TEST RESULTS:   At the time of discharge the following test results are still pending: none    FOLLOW UP APPOINTMENTS:   Pcp  Vascular surgery    ADDITIONAL CARE RECOMMENDATIONS: as above    DIET: cardiac diet    ACTIVITY: activity as tolerated      DISCHARGE MEDICATIONS:   See Medication Reconciliation Form    It is important that you take the medication exactly as they are prescribed.   Keep your medication in the bottles provided by the pharmacist and keep a list of the medication names, dosages, and times to be taken in your wallet.   Do not take other medications without consulting your doctor.       NOTIFY YOUR PHYSICIAN FOR ANY OF THE FOLLOWING:   Fever over 101 degrees for 24 hours.   Chest pain, shortness of breath, fever, chills, nausea, vomiting, diarrhea, change in mentation, falling, weakness, bleeding. Severe pain or pain not relieved by medications.  Or, any other signs or symptoms that you may have questions about.      DISPOSITION:   x Home With:   OT  PT  HH  RN       SNF/Inpatient Rehab/LTAC    Independent/assisted living    Hospice    Other:     CDMP Checked:   Yes x     PROBLEM LIST Updated:  Yes x       Signed:   Moustapha Thrasher MD  6/11/2025  9:20 AM

## 2025-06-12 ENCOUNTER — TELEPHONE (OUTPATIENT)
Facility: CLINIC | Age: 75
End: 2025-06-12

## 2025-06-12 NOTE — TELEPHONE ENCOUNTER
Care Transitions Initial Follow Up Call    Outreach made within 2 business days of discharge: Yes    Patient: Daina Soto Patient : 1950   MRN: [id number]  Reason for Admission:   Discharge Date: 25  Type B aortic dissection      Spoke with: Daina Vaughanne, Patient is currently in [facility] and will call to make a follow up when discharged     Discharge department/facility: [facility]        Follow Up  Future Appointments   Date Time Provider Department Center   2025  1:30 PM Marla Mac DO [facility] DEP       Cici Candelaria MA

## 2025-08-03 DIAGNOSIS — G89.29 CHRONIC BILATERAL LOW BACK PAIN WITH LEFT-SIDED SCIATICA: ICD-10-CM

## 2025-08-03 DIAGNOSIS — M54.42 CHRONIC BILATERAL LOW BACK PAIN WITH LEFT-SIDED SCIATICA: ICD-10-CM

## 2025-08-04 ENCOUNTER — APPOINTMENT (OUTPATIENT)
Facility: HOSPITAL | Age: 75
End: 2025-08-04
Payer: MEDICARE

## 2025-08-04 ENCOUNTER — HOSPITAL ENCOUNTER (EMERGENCY)
Facility: HOSPITAL | Age: 75
Discharge: ANOTHER HOSPITAL | End: 2025-08-04
Payer: MEDICARE

## 2025-08-04 ENCOUNTER — HOSPITAL ENCOUNTER (OUTPATIENT)
Facility: HOSPITAL | Age: 75
Setting detail: OBSERVATION
LOS: 1 days | Discharge: HOME HEALTH | End: 2025-08-05
Attending: INTERNAL MEDICINE | Admitting: INTERNAL MEDICINE
Payer: MEDICARE

## 2025-08-04 VITALS
SYSTOLIC BLOOD PRESSURE: 132 MMHG | OXYGEN SATURATION: 100 % | WEIGHT: 240 LBS | RESPIRATION RATE: 31 BRPM | DIASTOLIC BLOOD PRESSURE: 65 MMHG | TEMPERATURE: 98.2 F | HEIGHT: 65 IN | BODY MASS INDEX: 39.99 KG/M2 | HEART RATE: 89 BPM

## 2025-08-04 DIAGNOSIS — I89.0 LYMPHEDEMA: Primary | ICD-10-CM

## 2025-08-04 DIAGNOSIS — I71.00: Primary | ICD-10-CM

## 2025-08-04 DIAGNOSIS — L03.119 CELLULITIS OF LOWER EXTREMITY, UNSPECIFIED LATERALITY: ICD-10-CM

## 2025-08-04 DIAGNOSIS — R06.02 SHORTNESS OF BREATH: ICD-10-CM

## 2025-08-04 LAB
ALBUMIN SERPL-MCNC: 3.3 G/DL (ref 3.5–5)
ALBUMIN/GLOB SERPL: 0.9 (ref 1.1–2.2)
ALP SERPL-CCNC: 74 U/L (ref 45–117)
ALT SERPL-CCNC: 23 U/L (ref 12–78)
ANION GAP SERPL CALC-SCNC: 10 MMOL/L (ref 2–12)
APPEARANCE UR: ABNORMAL
AST SERPL W P-5'-P-CCNC: 18 U/L (ref 15–37)
BACTERIA URNS QL MICRO: NEGATIVE /HPF
BASOPHILS # BLD: 0.04 K/UL (ref 0–0.1)
BASOPHILS NFR BLD: 0.6 % (ref 0–1)
BILIRUB SERPL-MCNC: 0.7 MG/DL (ref 0.2–1)
BILIRUB UR QL: NEGATIVE
BUN SERPL-MCNC: 38 MG/DL (ref 6–20)
BUN/CREAT SERPL: 35 (ref 12–20)
CA-I BLD-MCNC: 11.2 MG/DL (ref 8.5–10.1)
CHLORIDE SERPL-SCNC: 97 MMOL/L (ref 97–108)
CO2 SERPL-SCNC: 28 MMOL/L (ref 21–32)
COLOR UR: ABNORMAL
CREAT SERPL-MCNC: 1.1 MG/DL (ref 0.55–1.02)
DIFFERENTIAL METHOD BLD: ABNORMAL
EKG ATRIAL RATE: 98 BPM
EKG DIAGNOSIS: NORMAL
EKG P AXIS: 73 DEGREES
EKG P-R INTERVAL: 162 MS
EKG Q-T INTERVAL: 398 MS
EKG QRS DURATION: 164 MS
EKG QTC CALCULATION (BAZETT): 508 MS
EKG R AXIS: -33 DEGREES
EKG T AXIS: 26 DEGREES
EKG VENTRICULAR RATE: 98 BPM
EOSINOPHIL # BLD: 0.32 K/UL (ref 0–0.4)
EOSINOPHIL NFR BLD: 4.7 % (ref 0–7)
EPITH CASTS URNS QL MICRO: ABNORMAL /LPF
ERYTHROCYTE [DISTWIDTH] IN BLOOD BY AUTOMATED COUNT: 19.9 % (ref 11.5–14.5)
GLOBULIN SER CALC-MCNC: 3.7 G/DL (ref 2–4)
GLUCOSE SERPL-MCNC: 123 MG/DL (ref 65–100)
GLUCOSE UR STRIP.AUTO-MCNC: NEGATIVE MG/DL
HCT VFR BLD AUTO: 37.1 % (ref 35–47)
HGB BLD-MCNC: 11.9 G/DL (ref 11.5–16)
HGB UR QL STRIP: NEGATIVE
IMM GRANULOCYTES # BLD AUTO: 0.05 K/UL (ref 0–0.04)
IMM GRANULOCYTES NFR BLD AUTO: 0.7 % (ref 0–0.5)
KETONES UR QL STRIP.AUTO: NEGATIVE MG/DL
LACTATE SERPL-SCNC: 1.5 MMOL/L (ref 0.4–2)
LEUKOCYTE ESTERASE UR QL STRIP.AUTO: NEGATIVE
LYMPHOCYTES # BLD: 1.08 K/UL (ref 0.8–3.5)
LYMPHOCYTES NFR BLD: 15.9 % (ref 12–49)
MAGNESIUM SERPL-MCNC: 2 MG/DL (ref 1.6–2.4)
MCH RBC QN AUTO: 27.8 PG (ref 26–34)
MCHC RBC AUTO-ENTMCNC: 32.1 G/DL (ref 30–36.5)
MCV RBC AUTO: 86.7 FL (ref 80–99)
MONOCYTES # BLD: 0.63 K/UL (ref 0–1)
MONOCYTES NFR BLD: 9.3 % (ref 5–13)
NEUTS SEG # BLD: 4.67 K/UL (ref 1.8–8)
NEUTS SEG NFR BLD: 68.8 % (ref 32–75)
NITRITE UR QL STRIP.AUTO: NEGATIVE
NRBC # BLD: 0 K/UL (ref 0–0.01)
NRBC BLD-RTO: 0 PER 100 WBC
PH UR STRIP: 8 (ref 5–8)
PLATELET # BLD AUTO: 241 K/UL (ref 150–400)
PMV BLD AUTO: 10.4 FL (ref 8.9–12.9)
POTASSIUM SERPL-SCNC: 4.2 MMOL/L (ref 3.5–5.1)
PROCALCITONIN SERPL-MCNC: 0.06 NG/ML
PROT SERPL-MCNC: 7 G/DL (ref 6.4–8.2)
PROT UR STRIP-MCNC: NEGATIVE MG/DL
RBC # BLD AUTO: 4.28 M/UL (ref 3.8–5.2)
RBC #/AREA URNS HPF: ABNORMAL /HPF (ref 0–5)
SODIUM SERPL-SCNC: 135 MMOL/L (ref 136–145)
SP GR UR REFRACTOMETRY: 1.02 (ref 1–1.03)
TROPONIN I SERPL HS-MCNC: 6 NG/L (ref 0–51)
URINE CULTURE IF INDICATED: ABNORMAL
UROBILINOGEN UR QL STRIP.AUTO: 0.1 EU/DL (ref 0.1–1)
WBC # BLD AUTO: 6.8 K/UL (ref 3.6–11)
WBC URNS QL MICRO: ABNORMAL /HPF (ref 0–4)

## 2025-08-04 PROCEDURE — 85025 COMPLETE CBC W/AUTO DIFF WBC: CPT

## 2025-08-04 PROCEDURE — 81001 URINALYSIS AUTO W/SCOPE: CPT

## 2025-08-04 PROCEDURE — 94640 AIRWAY INHALATION TREATMENT: CPT

## 2025-08-04 PROCEDURE — 83605 ASSAY OF LACTIC ACID: CPT

## 2025-08-04 PROCEDURE — 96365 THER/PROPH/DIAG IV INF INIT: CPT

## 2025-08-04 PROCEDURE — 83735 ASSAY OF MAGNESIUM: CPT

## 2025-08-04 PROCEDURE — 99285 EMERGENCY DEPT VISIT HI MDM: CPT

## 2025-08-04 PROCEDURE — 71275 CT ANGIOGRAPHY CHEST: CPT

## 2025-08-04 PROCEDURE — 6360000004 HC RX CONTRAST MEDICATION

## 2025-08-04 PROCEDURE — 36415 COLL VENOUS BLD VENIPUNCTURE: CPT

## 2025-08-04 PROCEDURE — 6360000002 HC RX W HCPCS

## 2025-08-04 PROCEDURE — 84145 PROCALCITONIN (PCT): CPT

## 2025-08-04 PROCEDURE — 2060000000 HC ICU INTERMEDIATE R&B

## 2025-08-04 PROCEDURE — 6370000000 HC RX 637 (ALT 250 FOR IP)

## 2025-08-04 PROCEDURE — 96366 THER/PROPH/DIAG IV INF ADDON: CPT

## 2025-08-04 PROCEDURE — 93005 ELECTROCARDIOGRAM TRACING: CPT

## 2025-08-04 PROCEDURE — 80053 COMPREHEN METABOLIC PANEL: CPT

## 2025-08-04 PROCEDURE — 84484 ASSAY OF TROPONIN QUANT: CPT

## 2025-08-04 RX ORDER — IOPAMIDOL 755 MG/ML
100 INJECTION, SOLUTION INTRAVASCULAR
Status: COMPLETED | OUTPATIENT
Start: 2025-08-04 | End: 2025-08-04

## 2025-08-04 RX ORDER — IPRATROPIUM BROMIDE AND ALBUTEROL SULFATE 2.5; .5 MG/3ML; MG/3ML
1 SOLUTION RESPIRATORY (INHALATION)
Status: COMPLETED | OUTPATIENT
Start: 2025-08-04 | End: 2025-08-04

## 2025-08-04 RX ORDER — ESMOLOL HYDROCHLORIDE 10 MG/ML
25-300 INJECTION, SOLUTION INTRAVENOUS CONTINUOUS
Status: DISCONTINUED | OUTPATIENT
Start: 2025-08-04 | End: 2025-08-04 | Stop reason: HOSPADM

## 2025-08-04 RX ADMIN — IPRATROPIUM BROMIDE AND ALBUTEROL SULFATE 1 DOSE: .5; 2.5 SOLUTION RESPIRATORY (INHALATION) at 12:07

## 2025-08-04 RX ADMIN — IOPAMIDOL 100 ML: 755 INJECTION, SOLUTION INTRAVENOUS at 11:45

## 2025-08-04 RX ADMIN — ESMOLOL HYDROCHLORIDE 50 MCG/KG/MIN: 10 INJECTION INTRAVENOUS at 13:06

## 2025-08-04 ASSESSMENT — PAIN - FUNCTIONAL ASSESSMENT: PAIN_FUNCTIONAL_ASSESSMENT: 0-10

## 2025-08-04 ASSESSMENT — PAIN SCALES - GENERAL
PAINLEVEL_OUTOF10: 0

## 2025-08-05 VITALS
HEART RATE: 86 BPM | OXYGEN SATURATION: 96 % | SYSTOLIC BLOOD PRESSURE: 117 MMHG | TEMPERATURE: 97.7 F | RESPIRATION RATE: 17 BRPM | DIASTOLIC BLOOD PRESSURE: 61 MMHG | WEIGHT: 244.3 LBS | BODY MASS INDEX: 40.7 KG/M2 | HEIGHT: 65 IN

## 2025-08-05 LAB
ALBUMIN SERPL-MCNC: 2.6 G/DL (ref 3.5–5.2)
ALBUMIN/GLOB SERPL: 0.9 (ref 1.1–2.2)
ALP SERPL-CCNC: 54 U/L (ref 35–104)
ALT SERPL-CCNC: 14 U/L (ref 10–35)
AMPHET UR QL SCN: NEGATIVE
ANION GAP SERPL CALC-SCNC: 9 MMOL/L (ref 2–14)
AST SERPL-CCNC: 13 U/L (ref 10–35)
BARBITURATES UR QL SCN: NEGATIVE
BASOPHILS # BLD: 0.02 K/UL (ref 0–0.1)
BASOPHILS NFR BLD: 0.3 % (ref 0–1)
BENZODIAZ UR QL: NEGATIVE
BILIRUB SERPL-MCNC: 0.7 MG/DL (ref 0–1.2)
BUN SERPL-MCNC: 29 MG/DL (ref 8–23)
BUN/CREAT SERPL: 34 (ref 12–20)
CALCIUM SERPL-MCNC: 11 MG/DL (ref 8.8–10.2)
CANNABINOIDS UR QL SCN: NEGATIVE
CHLORIDE SERPL-SCNC: 98 MMOL/L (ref 98–107)
CO2 SERPL-SCNC: 27 MMOL/L (ref 20–29)
COCAINE UR QL SCN: NEGATIVE
CREAT SERPL-MCNC: 0.85 MG/DL (ref 0.6–1)
DIFFERENTIAL METHOD BLD: ABNORMAL
EOSINOPHIL # BLD: 0.37 K/UL (ref 0–0.4)
EOSINOPHIL NFR BLD: 6.4 % (ref 0–7)
ERYTHROCYTE [DISTWIDTH] IN BLOOD BY AUTOMATED COUNT: 19.6 % (ref 11.5–14.5)
EST. AVERAGE GLUCOSE BLD GHB EST-MCNC: 120 MG/DL
FENTANYL: NEGATIVE
FOLATE SERPL-MCNC: 11.3 NG/ML (ref 4.8–24.2)
GLOBULIN SER CALC-MCNC: 2.9 G/DL (ref 2–4)
GLUCOSE SERPL-MCNC: 76 MG/DL (ref 65–100)
HBA1C MFR BLD: 5.8 % (ref 4–5.6)
HCT VFR BLD AUTO: 31.9 % (ref 35–47)
HGB BLD-MCNC: 10.2 G/DL (ref 11.5–16)
IMM GRANULOCYTES # BLD AUTO: 0.03 K/UL (ref 0–0.04)
IMM GRANULOCYTES NFR BLD AUTO: 0.5 % (ref 0–0.5)
LYMPHOCYTES # BLD: 1.05 K/UL (ref 0.8–3.5)
LYMPHOCYTES NFR BLD: 18.1 % (ref 12–49)
Lab: NORMAL
MAGNESIUM SERPL-MCNC: 2 MG/DL (ref 1.6–2.4)
MCH RBC QN AUTO: 28 PG (ref 26–34)
MCHC RBC AUTO-ENTMCNC: 32 G/DL (ref 30–36.5)
MCV RBC AUTO: 87.6 FL (ref 80–99)
METHADONE UR QL: NEGATIVE
MONOCYTES # BLD: 0.76 K/UL (ref 0–1)
MONOCYTES NFR BLD: 13.1 % (ref 5–13)
NEUTS SEG # BLD: 3.56 K/UL (ref 1.8–8)
NEUTS SEG NFR BLD: 61.6 % (ref 32–75)
NRBC # BLD: 0 K/UL (ref 0–0.01)
NRBC BLD-RTO: 0 PER 100 WBC
NT PRO BNP: 196 PG/ML (ref 0–450)
OPIATES UR QL: NEGATIVE
PCP UR QL: NEGATIVE
PHOSPHATE SERPL-MCNC: 2.6 MG/DL (ref 2.5–4.5)
PLATELET # BLD AUTO: 190 K/UL (ref 150–400)
PMV BLD AUTO: 9.9 FL (ref 8.9–12.9)
POTASSIUM SERPL-SCNC: 3.7 MMOL/L (ref 3.5–5.1)
PROT SERPL-MCNC: 5.5 G/DL (ref 6.4–8.3)
RBC # BLD AUTO: 3.64 M/UL (ref 3.8–5.2)
SODIUM SERPL-SCNC: 134 MMOL/L (ref 136–145)
TROPONIN T SERPL HS-MCNC: 35.9 NG/L (ref 0–14)
TSH, 3RD GENERATION: 0.99 UIU/ML (ref 0.27–4.2)
VIT B12 SERPL-MCNC: 422 PG/ML (ref 232–1245)
WBC # BLD AUTO: 5.8 K/UL (ref 3.6–11)

## 2025-08-05 PROCEDURE — 82607 VITAMIN B-12: CPT

## 2025-08-05 PROCEDURE — 84443 ASSAY THYROID STIM HORMONE: CPT

## 2025-08-05 PROCEDURE — 83880 ASSAY OF NATRIURETIC PEPTIDE: CPT

## 2025-08-05 PROCEDURE — 84484 ASSAY OF TROPONIN QUANT: CPT

## 2025-08-05 PROCEDURE — 82746 ASSAY OF FOLIC ACID SERUM: CPT

## 2025-08-05 PROCEDURE — 83036 HEMOGLOBIN GLYCOSYLATED A1C: CPT

## 2025-08-05 PROCEDURE — 84100 ASSAY OF PHOSPHORUS: CPT

## 2025-08-05 PROCEDURE — 2500000003 HC RX 250 WO HCPCS: Performed by: INTERNAL MEDICINE

## 2025-08-05 PROCEDURE — 6370000000 HC RX 637 (ALT 250 FOR IP): Performed by: HOSPITALIST

## 2025-08-05 PROCEDURE — 6370000000 HC RX 637 (ALT 250 FOR IP): Performed by: INTERNAL MEDICINE

## 2025-08-05 PROCEDURE — 6360000002 HC RX W HCPCS: Performed by: INTERNAL MEDICINE

## 2025-08-05 PROCEDURE — G0378 HOSPITAL OBSERVATION PER HR: HCPCS

## 2025-08-05 PROCEDURE — 94760 N-INVAS EAR/PLS OXIMETRY 1: CPT

## 2025-08-05 PROCEDURE — 83735 ASSAY OF MAGNESIUM: CPT

## 2025-08-05 PROCEDURE — 80053 COMPREHEN METABOLIC PANEL: CPT

## 2025-08-05 PROCEDURE — 85025 COMPLETE CBC W/AUTO DIFF WBC: CPT

## 2025-08-05 RX ORDER — IPRATROPIUM BROMIDE AND ALBUTEROL SULFATE 2.5; .5 MG/3ML; MG/3ML
1 SOLUTION RESPIRATORY (INHALATION) EVERY 6 HOURS PRN
Status: DISCONTINUED | OUTPATIENT
Start: 2025-08-05 | End: 2025-08-05 | Stop reason: HOSPADM

## 2025-08-05 RX ORDER — ONDANSETRON 2 MG/ML
4 INJECTION INTRAMUSCULAR; INTRAVENOUS EVERY 6 HOURS PRN
Status: DISCONTINUED | OUTPATIENT
Start: 2025-08-05 | End: 2025-08-05 | Stop reason: HOSPADM

## 2025-08-05 RX ORDER — ASPIRIN 81 MG/1
81 TABLET ORAL DAILY
Status: DISCONTINUED | OUTPATIENT
Start: 2025-08-05 | End: 2025-08-05 | Stop reason: HOSPADM

## 2025-08-05 RX ORDER — POTASSIUM CHLORIDE 7.45 MG/ML
10 INJECTION INTRAVENOUS PRN
Status: DISCONTINUED | OUTPATIENT
Start: 2025-08-05 | End: 2025-08-05 | Stop reason: HOSPADM

## 2025-08-05 RX ORDER — ACETAMINOPHEN 325 MG/1
650 TABLET ORAL EVERY 4 HOURS PRN
Status: DISCONTINUED | OUTPATIENT
Start: 2025-08-05 | End: 2025-08-05 | Stop reason: SDUPTHER

## 2025-08-05 RX ORDER — MAGNESIUM SULFATE IN WATER 40 MG/ML
2000 INJECTION, SOLUTION INTRAVENOUS PRN
Status: DISCONTINUED | OUTPATIENT
Start: 2025-08-05 | End: 2025-08-05 | Stop reason: HOSPADM

## 2025-08-05 RX ORDER — ONDANSETRON 4 MG/1
4 TABLET, ORALLY DISINTEGRATING ORAL EVERY 8 HOURS PRN
Status: DISCONTINUED | OUTPATIENT
Start: 2025-08-05 | End: 2025-08-05 | Stop reason: HOSPADM

## 2025-08-05 RX ORDER — TRAMADOL HYDROCHLORIDE 50 MG/1
50 TABLET ORAL EVERY 6 HOURS PRN
Status: DISCONTINUED | OUTPATIENT
Start: 2025-08-05 | End: 2025-08-05 | Stop reason: HOSPADM

## 2025-08-05 RX ORDER — ACETAMINOPHEN 650 MG/1
650 SUPPOSITORY RECTAL EVERY 6 HOURS PRN
Status: DISCONTINUED | OUTPATIENT
Start: 2025-08-05 | End: 2025-08-05 | Stop reason: HOSPADM

## 2025-08-05 RX ORDER — POTASSIUM CHLORIDE 750 MG/1
40 TABLET, EXTENDED RELEASE ORAL PRN
Status: DISCONTINUED | OUTPATIENT
Start: 2025-08-05 | End: 2025-08-05 | Stop reason: HOSPADM

## 2025-08-05 RX ORDER — IPRATROPIUM BROMIDE AND ALBUTEROL SULFATE 2.5; .5 MG/3ML; MG/3ML
3 SOLUTION RESPIRATORY (INHALATION) EVERY 6 HOURS PRN
Qty: 360 ML | Refills: 0 | Status: SHIPPED | OUTPATIENT
Start: 2025-08-05 | End: 2025-09-04

## 2025-08-05 RX ORDER — SODIUM CHLORIDE 0.9 % (FLUSH) 0.9 %
5-40 SYRINGE (ML) INJECTION EVERY 12 HOURS SCHEDULED
Status: DISCONTINUED | OUTPATIENT
Start: 2025-08-05 | End: 2025-08-05 | Stop reason: HOSPADM

## 2025-08-05 RX ORDER — ENOXAPARIN SODIUM 100 MG/ML
30 INJECTION SUBCUTANEOUS 2 TIMES DAILY
Status: DISCONTINUED | OUTPATIENT
Start: 2025-08-05 | End: 2025-08-05 | Stop reason: HOSPADM

## 2025-08-05 RX ORDER — CEPHALEXIN 500 MG/1
500 CAPSULE ORAL 4 TIMES DAILY
Qty: 5 CAPSULE | Refills: 0 | Status: SHIPPED | OUTPATIENT
Start: 2025-08-05 | End: 2025-08-07

## 2025-08-05 RX ORDER — CEPHALEXIN 500 MG/1
500 CAPSULE ORAL EVERY 6 HOURS SCHEDULED
Status: DISCONTINUED | OUTPATIENT
Start: 2025-08-05 | End: 2025-08-05 | Stop reason: HOSPADM

## 2025-08-05 RX ORDER — SODIUM CHLORIDE 9 MG/ML
INJECTION, SOLUTION INTRAVENOUS PRN
Status: DISCONTINUED | OUTPATIENT
Start: 2025-08-05 | End: 2025-08-05 | Stop reason: HOSPADM

## 2025-08-05 RX ORDER — POLYETHYLENE GLYCOL 3350 17 G/17G
17 POWDER, FOR SOLUTION ORAL DAILY PRN
Status: DISCONTINUED | OUTPATIENT
Start: 2025-08-05 | End: 2025-08-05 | Stop reason: HOSPADM

## 2025-08-05 RX ORDER — ALLOPURINOL 100 MG/1
100 TABLET ORAL DAILY
Status: DISCONTINUED | OUTPATIENT
Start: 2025-08-05 | End: 2025-08-05 | Stop reason: HOSPADM

## 2025-08-05 RX ORDER — HYDROMORPHONE HYDROCHLORIDE 1 MG/ML
1 INJECTION, SOLUTION INTRAMUSCULAR; INTRAVENOUS; SUBCUTANEOUS EVERY 4 HOURS PRN
Refills: 0 | Status: DISCONTINUED | OUTPATIENT
Start: 2025-08-05 | End: 2025-08-05 | Stop reason: HOSPADM

## 2025-08-05 RX ORDER — TRAMADOL HYDROCHLORIDE 50 MG/1
TABLET ORAL
Qty: 90 TABLET | OUTPATIENT
Start: 2025-08-05

## 2025-08-05 RX ORDER — SODIUM CHLORIDE 0.9 % (FLUSH) 0.9 %
5-40 SYRINGE (ML) INJECTION PRN
Status: DISCONTINUED | OUTPATIENT
Start: 2025-08-05 | End: 2025-08-05 | Stop reason: HOSPADM

## 2025-08-05 RX ORDER — ACETAMINOPHEN 325 MG/1
650 TABLET ORAL EVERY 6 HOURS PRN
Status: DISCONTINUED | OUTPATIENT
Start: 2025-08-05 | End: 2025-08-05 | Stop reason: HOSPADM

## 2025-08-05 RX ORDER — FUROSEMIDE 40 MG/1
20 TABLET ORAL 2 TIMES DAILY
Status: DISCONTINUED | OUTPATIENT
Start: 2025-08-05 | End: 2025-08-05 | Stop reason: HOSPADM

## 2025-08-05 RX ORDER — OXYCODONE HYDROCHLORIDE 5 MG/1
5 TABLET ORAL EVERY 4 HOURS PRN
Refills: 0 | Status: DISCONTINUED | OUTPATIENT
Start: 2025-08-05 | End: 2025-08-05 | Stop reason: HOSPADM

## 2025-08-05 RX ADMIN — TRAMADOL HYDROCHLORIDE 50 MG: 50 TABLET, COATED ORAL at 05:10

## 2025-08-05 RX ADMIN — FUROSEMIDE 20 MG: 40 TABLET ORAL at 08:39

## 2025-08-05 RX ADMIN — ALLOPURINOL 100 MG: 100 TABLET ORAL at 10:25

## 2025-08-05 RX ADMIN — SODIUM CHLORIDE, PRESERVATIVE FREE 10 ML: 5 INJECTION INTRAVENOUS at 08:42

## 2025-08-05 RX ADMIN — CEPHALEXIN 500 MG: 500 CAPSULE ORAL at 12:00

## 2025-08-05 RX ADMIN — ENOXAPARIN SODIUM 30 MG: 100 INJECTION SUBCUTANEOUS at 08:40

## 2025-08-05 RX ADMIN — ASPIRIN 81 MG: 81 TABLET, COATED ORAL at 10:25

## 2025-08-05 ASSESSMENT — PAIN SCALES - GENERAL
PAINLEVEL_OUTOF10: 0
PAINLEVEL_OUTOF10: 8

## 2025-08-05 ASSESSMENT — ENCOUNTER SYMPTOMS
ABDOMINAL DISTENTION: 0
SHORTNESS OF BREATH: 0
WHEEZING: 0
CHEST TIGHTNESS: 0
ABDOMINAL PAIN: 0
COUGH: 0

## 2025-08-05 ASSESSMENT — PAIN DESCRIPTION - LOCATION: LOCATION: HIP

## 2025-08-05 ASSESSMENT — PAIN DESCRIPTION - DESCRIPTORS: DESCRIPTORS: ACHING;DULL;GNAWING

## 2025-08-05 ASSESSMENT — PAIN DESCRIPTION - ORIENTATION: ORIENTATION: RIGHT;LEFT

## 2025-08-06 ENCOUNTER — TELEPHONE (OUTPATIENT)
Facility: CLINIC | Age: 75
End: 2025-08-06

## 2025-08-06 LAB — TROPONIN T SERPL-MCNC: 32 NG/L (ref 0–14)

## 2025-08-12 ENCOUNTER — OFFICE VISIT (OUTPATIENT)
Facility: CLINIC | Age: 75
End: 2025-08-12
Payer: MEDICARE

## 2025-08-12 VITALS
DIASTOLIC BLOOD PRESSURE: 82 MMHG | HEIGHT: 65 IN | RESPIRATION RATE: 17 BRPM | SYSTOLIC BLOOD PRESSURE: 134 MMHG | BODY MASS INDEX: 39.99 KG/M2 | HEART RATE: 88 BPM | OXYGEN SATURATION: 96 % | TEMPERATURE: 97.5 F | WEIGHT: 240 LBS

## 2025-08-12 DIAGNOSIS — H10.10 SEASONAL ALLERGIC CONJUNCTIVITIS: ICD-10-CM

## 2025-08-12 DIAGNOSIS — M54.42 CHRONIC BILATERAL LOW BACK PAIN WITH LEFT-SIDED SCIATICA: ICD-10-CM

## 2025-08-12 DIAGNOSIS — E87.70 LOCALIZED EDEMA DUE TO FLUID OVERLOAD: Primary | ICD-10-CM

## 2025-08-12 DIAGNOSIS — G89.29 CHRONIC BILATERAL LOW BACK PAIN WITH LEFT-SIDED SCIATICA: ICD-10-CM

## 2025-08-12 DIAGNOSIS — Z09 HOSPITAL DISCHARGE FOLLOW-UP: ICD-10-CM

## 2025-08-12 PROCEDURE — 1036F TOBACCO NON-USER: CPT | Performed by: NURSE PRACTITIONER

## 2025-08-12 PROCEDURE — 3075F SYST BP GE 130 - 139MM HG: CPT | Performed by: NURSE PRACTITIONER

## 2025-08-12 PROCEDURE — 3079F DIAST BP 80-89 MM HG: CPT | Performed by: NURSE PRACTITIONER

## 2025-08-12 PROCEDURE — G8417 CALC BMI ABV UP PARAM F/U: HCPCS | Performed by: NURSE PRACTITIONER

## 2025-08-12 PROCEDURE — 1160F RVW MEDS BY RX/DR IN RCRD: CPT | Performed by: NURSE PRACTITIONER

## 2025-08-12 PROCEDURE — 1090F PRES/ABSN URINE INCON ASSESS: CPT | Performed by: NURSE PRACTITIONER

## 2025-08-12 PROCEDURE — 1159F MED LIST DOCD IN RCRD: CPT | Performed by: NURSE PRACTITIONER

## 2025-08-12 PROCEDURE — 1111F DSCHRG MED/CURRENT MED MERGE: CPT | Performed by: NURSE PRACTITIONER

## 2025-08-12 PROCEDURE — G8400 PT W/DXA NO RESULTS DOC: HCPCS | Performed by: NURSE PRACTITIONER

## 2025-08-12 PROCEDURE — G8427 DOCREV CUR MEDS BY ELIG CLIN: HCPCS | Performed by: NURSE PRACTITIONER

## 2025-08-12 PROCEDURE — 1123F ACP DISCUSS/DSCN MKR DOCD: CPT | Performed by: NURSE PRACTITIONER

## 2025-08-12 PROCEDURE — 99215 OFFICE O/P EST HI 40 MIN: CPT | Performed by: NURSE PRACTITIONER

## 2025-08-12 PROCEDURE — 3017F COLORECTAL CA SCREEN DOC REV: CPT | Performed by: NURSE PRACTITIONER

## 2025-08-12 RX ORDER — PREDNISONE 20 MG/1
TABLET ORAL
Qty: 20 TABLET | Refills: 1 | Status: SHIPPED | OUTPATIENT
Start: 2025-08-12

## 2025-08-12 RX ORDER — TRAMADOL HYDROCHLORIDE 50 MG/1
50 TABLET ORAL EVERY 8 HOURS PRN
Qty: 90 TABLET | Refills: 0 | Status: SHIPPED | OUTPATIENT
Start: 2025-08-12 | End: 2025-09-11

## 2025-08-12 RX ORDER — CETIRIZINE HYDROCHLORIDE 10 MG/1
10 TABLET ORAL DAILY
Qty: 90 TABLET | Refills: 0 | Status: SHIPPED | OUTPATIENT
Start: 2025-08-12

## 2025-08-12 RX ORDER — OFLOXACIN 3 MG/ML
1 SOLUTION/ DROPS OPHTHALMIC 4 TIMES DAILY
Qty: 5 ML | Refills: 0 | Status: SHIPPED | OUTPATIENT
Start: 2025-08-12 | End: 2025-08-22

## 2025-08-18 ENCOUNTER — HOSPITAL ENCOUNTER (EMERGENCY)
Facility: HOSPITAL | Age: 75
Discharge: HOME | End: 2025-08-18
Attending: FAMILY MEDICINE
Payer: MEDICARE

## 2025-08-18 ENCOUNTER — APPOINTMENT (OUTPATIENT)
Facility: HOSPITAL | Age: 75
End: 2025-08-18
Payer: MEDICARE

## 2025-08-18 VITALS
HEART RATE: 87 BPM | SYSTOLIC BLOOD PRESSURE: 129 MMHG | BODY MASS INDEX: 39.99 KG/M2 | OXYGEN SATURATION: 98 % | RESPIRATION RATE: 23 BRPM | DIASTOLIC BLOOD PRESSURE: 61 MMHG | WEIGHT: 240 LBS | HEIGHT: 65 IN | TEMPERATURE: 97.8 F

## 2025-08-18 DIAGNOSIS — I71.00 DISSECTION OF AORTA, UNSPECIFIED PORTION OF AORTA (HCC): ICD-10-CM

## 2025-08-18 DIAGNOSIS — K40.20 BILATERAL INGUINAL HERNIA WITHOUT OBSTRUCTION OR GANGRENE, RECURRENCE NOT SPECIFIED: ICD-10-CM

## 2025-08-18 DIAGNOSIS — M89.8X9 LYTIC LESION OF BONE ON X-RAY: ICD-10-CM

## 2025-08-18 DIAGNOSIS — E87.6 HYPOKALEMIA: ICD-10-CM

## 2025-08-18 DIAGNOSIS — R10.31 RIGHT INGUINAL PAIN: Primary | ICD-10-CM

## 2025-08-18 DIAGNOSIS — R59.0 INTRA-ABDOMINAL LYMPHADENOPATHY: ICD-10-CM

## 2025-08-18 LAB
ALBUMIN SERPL-MCNC: 3.1 G/DL (ref 3.5–5)
ALBUMIN/GLOB SERPL: 0.8 (ref 1.1–2.2)
ALP SERPL-CCNC: 63 U/L (ref 45–117)
ALT SERPL-CCNC: 21 U/L (ref 12–78)
ANION GAP SERPL CALC-SCNC: 11 MMOL/L (ref 2–12)
APPEARANCE UR: CLEAR
AST SERPL W P-5'-P-CCNC: 20 U/L (ref 15–37)
BACTERIA URNS QL MICRO: NEGATIVE /HPF
BASOPHILS # BLD: 0.02 K/UL (ref 0–0.1)
BASOPHILS NFR BLD: 0.4 % (ref 0–1)
BILIRUB SERPL-MCNC: 0.8 MG/DL (ref 0.2–1)
BILIRUB UR QL: NEGATIVE
BUN SERPL-MCNC: 39 MG/DL (ref 6–20)
BUN/CREAT SERPL: 31 (ref 12–20)
CA-I BLD-MCNC: 11 MG/DL (ref 8.5–10.1)
CHLORIDE SERPL-SCNC: 97 MMOL/L (ref 97–108)
CO2 SERPL-SCNC: 30 MMOL/L (ref 21–32)
COLOR UR: ABNORMAL
CREAT SERPL-MCNC: 1.26 MG/DL (ref 0.55–1.02)
DIFFERENTIAL METHOD BLD: ABNORMAL
EOSINOPHIL # BLD: 0.23 K/UL (ref 0–0.4)
EOSINOPHIL NFR BLD: 4.1 % (ref 0–7)
EPITH CASTS URNS QL MICRO: ABNORMAL /LPF
ERYTHROCYTE [DISTWIDTH] IN BLOOD BY AUTOMATED COUNT: 18.2 % (ref 11.5–14.5)
GLOBULIN SER CALC-MCNC: 3.7 G/DL (ref 2–4)
GLUCOSE SERPL-MCNC: 88 MG/DL (ref 65–100)
GLUCOSE UR STRIP.AUTO-MCNC: NEGATIVE MG/DL
HCT VFR BLD AUTO: 34.6 % (ref 35–47)
HGB BLD-MCNC: 11.1 G/DL (ref 11.5–16)
HGB UR QL STRIP: NEGATIVE
IMM GRANULOCYTES # BLD AUTO: 0.01 K/UL (ref 0–0.04)
IMM GRANULOCYTES NFR BLD AUTO: 0.2 % (ref 0–0.5)
KETONES UR QL STRIP.AUTO: NEGATIVE MG/DL
LEUKOCYTE ESTERASE UR QL STRIP.AUTO: NEGATIVE
LIPASE SERPL-CCNC: 14 U/L (ref 13–75)
LYMPHOCYTES # BLD: 1.78 K/UL (ref 0.8–3.5)
LYMPHOCYTES NFR BLD: 31.4 % (ref 12–49)
MCH RBC QN AUTO: 28.5 PG (ref 26–34)
MCHC RBC AUTO-ENTMCNC: 32.1 G/DL (ref 30–36.5)
MCV RBC AUTO: 88.7 FL (ref 80–99)
MONOCYTES # BLD: 0.82 K/UL (ref 0–1)
MONOCYTES NFR BLD: 14.5 % (ref 5–13)
NEUTS SEG # BLD: 2.8 K/UL (ref 1.8–8)
NEUTS SEG NFR BLD: 49.4 % (ref 32–75)
NITRITE UR QL STRIP.AUTO: NEGATIVE
PH UR STRIP: 7 (ref 5–8)
PLATELET # BLD AUTO: 262 K/UL (ref 150–400)
PMV BLD AUTO: 10 FL (ref 8.9–12.9)
POTASSIUM SERPL-SCNC: 3.1 MMOL/L (ref 3.5–5.1)
PROT SERPL-MCNC: 6.8 G/DL (ref 6.4–8.2)
PROT UR STRIP-MCNC: NEGATIVE MG/DL
RBC # BLD AUTO: 3.9 M/UL (ref 3.8–5.2)
RBC #/AREA URNS HPF: ABNORMAL /HPF (ref 0–5)
SODIUM SERPL-SCNC: 138 MMOL/L (ref 136–145)
SP GR UR REFRACTOMETRY: 1 (ref 1–1.03)
TROPONIN I SERPL HS-MCNC: 12 NG/L (ref 0–51)
URINE CULTURE IF INDICATED: ABNORMAL
UROBILINOGEN UR QL STRIP.AUTO: 0.1 EU/DL (ref 0.2–1)
WBC # BLD AUTO: 5.7 K/UL (ref 3.6–11)
WBC URNS QL MICRO: ABNORMAL /HPF (ref 0–4)

## 2025-08-18 PROCEDURE — 83690 ASSAY OF LIPASE: CPT

## 2025-08-18 PROCEDURE — 6360000002 HC RX W HCPCS: Performed by: FAMILY MEDICINE

## 2025-08-18 PROCEDURE — 6360000004 HC RX CONTRAST MEDICATION: Performed by: FAMILY MEDICINE

## 2025-08-18 PROCEDURE — 81001 URINALYSIS AUTO W/SCOPE: CPT

## 2025-08-18 PROCEDURE — 99285 EMERGENCY DEPT VISIT HI MDM: CPT

## 2025-08-18 PROCEDURE — 71275 CT ANGIOGRAPHY CHEST: CPT

## 2025-08-18 PROCEDURE — 96374 THER/PROPH/DIAG INJ IV PUSH: CPT

## 2025-08-18 PROCEDURE — 96375 TX/PRO/DX INJ NEW DRUG ADDON: CPT

## 2025-08-18 PROCEDURE — 84484 ASSAY OF TROPONIN QUANT: CPT

## 2025-08-18 PROCEDURE — 85025 COMPLETE CBC W/AUTO DIFF WBC: CPT

## 2025-08-18 PROCEDURE — 2580000003 HC RX 258: Performed by: FAMILY MEDICINE

## 2025-08-18 PROCEDURE — 93005 ELECTROCARDIOGRAM TRACING: CPT | Performed by: FAMILY MEDICINE

## 2025-08-18 PROCEDURE — 36415 COLL VENOUS BLD VENIPUNCTURE: CPT

## 2025-08-18 PROCEDURE — 71045 X-RAY EXAM CHEST 1 VIEW: CPT

## 2025-08-18 PROCEDURE — 6370000000 HC RX 637 (ALT 250 FOR IP): Performed by: FAMILY MEDICINE

## 2025-08-18 PROCEDURE — 80053 COMPREHEN METABOLIC PANEL: CPT

## 2025-08-18 RX ORDER — SENNOSIDES 8.6 MG
650 CAPSULE ORAL EVERY 8 HOURS PRN
Qty: 12 TABLET | Refills: 0 | Status: SHIPPED | OUTPATIENT
Start: 2025-08-18 | End: 2025-08-22

## 2025-08-18 RX ORDER — 0.9 % SODIUM CHLORIDE 0.9 %
500 INTRAVENOUS SOLUTION INTRAVENOUS
Status: COMPLETED | OUTPATIENT
Start: 2025-08-18 | End: 2025-08-18

## 2025-08-18 RX ORDER — IOPAMIDOL 755 MG/ML
100 INJECTION, SOLUTION INTRAVASCULAR
Status: COMPLETED | OUTPATIENT
Start: 2025-08-18 | End: 2025-08-18

## 2025-08-18 RX ORDER — POTASSIUM CHLORIDE 1500 MG/1
40 TABLET, EXTENDED RELEASE ORAL ONCE
Status: COMPLETED | OUTPATIENT
Start: 2025-08-18 | End: 2025-08-18

## 2025-08-18 RX ORDER — MORPHINE SULFATE 4 MG/ML
4 INJECTION, SOLUTION INTRAMUSCULAR; INTRAVENOUS ONCE
Refills: 0 | Status: COMPLETED | OUTPATIENT
Start: 2025-08-18 | End: 2025-08-18

## 2025-08-18 RX ORDER — ONDANSETRON 2 MG/ML
4 INJECTION INTRAMUSCULAR; INTRAVENOUS
Status: COMPLETED | OUTPATIENT
Start: 2025-08-18 | End: 2025-08-18

## 2025-08-18 RX ADMIN — MORPHINE SULFATE 4 MG: 4 INJECTION, SOLUTION INTRAMUSCULAR; INTRAVENOUS at 11:28

## 2025-08-18 RX ADMIN — IOPAMIDOL 100 ML: 755 INJECTION, SOLUTION INTRAVENOUS at 12:26

## 2025-08-18 RX ADMIN — SODIUM CHLORIDE 500 ML: 0.9 INJECTION, SOLUTION INTRAVENOUS at 11:26

## 2025-08-18 RX ADMIN — ONDANSETRON 4 MG: 2 INJECTION, SOLUTION INTRAMUSCULAR; INTRAVENOUS at 11:25

## 2025-08-18 RX ADMIN — POTASSIUM CHLORIDE 40 MEQ: 1500 TABLET, EXTENDED RELEASE ORAL at 14:09

## 2025-08-18 ASSESSMENT — PAIN - FUNCTIONAL ASSESSMENT: PAIN_FUNCTIONAL_ASSESSMENT: 0-10

## 2025-08-18 ASSESSMENT — PAIN SCALES - GENERAL: PAINLEVEL_OUTOF10: 8

## 2025-08-19 LAB
EKG ATRIAL RATE: 101 BPM
EKG DIAGNOSIS: NORMAL
EKG P AXIS: 24 DEGREES
EKG P-R INTERVAL: 180 MS
EKG Q-T INTERVAL: 404 MS
EKG QRS DURATION: 184 MS
EKG QTC CALCULATION (BAZETT): 523 MS
EKG R AXIS: -52 DEGREES
EKG T AXIS: 41 DEGREES
EKG VENTRICULAR RATE: 101 BPM

## 2025-08-21 ENCOUNTER — OFFICE VISIT (OUTPATIENT)
Facility: CLINIC | Age: 75
End: 2025-08-21
Payer: MEDICARE

## 2025-08-21 VITALS
DIASTOLIC BLOOD PRESSURE: 61 MMHG | TEMPERATURE: 97.6 F | OXYGEN SATURATION: 98 % | BODY MASS INDEX: 39.89 KG/M2 | WEIGHT: 239.4 LBS | HEIGHT: 65 IN | SYSTOLIC BLOOD PRESSURE: 100 MMHG | HEART RATE: 98 BPM

## 2025-08-21 DIAGNOSIS — K40.90 RIGHT INGUINAL HERNIA: Primary | ICD-10-CM

## 2025-08-21 DIAGNOSIS — I71.012 DISSECTING ANEURYSM OF THORACIC AORTA, STANFORD TYPE B (HCC): ICD-10-CM

## 2025-08-21 PROCEDURE — G8400 PT W/DXA NO RESULTS DOC: HCPCS | Performed by: NURSE PRACTITIONER

## 2025-08-21 PROCEDURE — 1159F MED LIST DOCD IN RCRD: CPT | Performed by: NURSE PRACTITIONER

## 2025-08-21 PROCEDURE — 3074F SYST BP LT 130 MM HG: CPT | Performed by: NURSE PRACTITIONER

## 2025-08-21 PROCEDURE — 1090F PRES/ABSN URINE INCON ASSESS: CPT | Performed by: NURSE PRACTITIONER

## 2025-08-21 PROCEDURE — 99213 OFFICE O/P EST LOW 20 MIN: CPT | Performed by: NURSE PRACTITIONER

## 2025-08-21 PROCEDURE — 3017F COLORECTAL CA SCREEN DOC REV: CPT | Performed by: NURSE PRACTITIONER

## 2025-08-21 PROCEDURE — 1036F TOBACCO NON-USER: CPT | Performed by: NURSE PRACTITIONER

## 2025-08-21 PROCEDURE — 1160F RVW MEDS BY RX/DR IN RCRD: CPT | Performed by: NURSE PRACTITIONER

## 2025-08-21 PROCEDURE — 3078F DIAST BP <80 MM HG: CPT | Performed by: NURSE PRACTITIONER

## 2025-08-21 PROCEDURE — G8427 DOCREV CUR MEDS BY ELIG CLIN: HCPCS | Performed by: NURSE PRACTITIONER

## 2025-08-21 PROCEDURE — G8417 CALC BMI ABV UP PARAM F/U: HCPCS | Performed by: NURSE PRACTITIONER

## 2025-08-21 PROCEDURE — 1123F ACP DISCUSS/DSCN MKR DOCD: CPT | Performed by: NURSE PRACTITIONER

## 2025-08-21 ASSESSMENT — ENCOUNTER SYMPTOMS
NAUSEA: 0
VOMITING: 0
EYE PAIN: 0
SORE THROAT: 0
WHEEZING: 0
TROUBLE SWALLOWING: 0
COLOR CHANGE: 0
EYE REDNESS: 0
CHEST TIGHTNESS: 0
EYE DISCHARGE: 0
COUGH: 0
SHORTNESS OF BREATH: 0

## 2025-08-22 ASSESSMENT — ENCOUNTER SYMPTOMS: ABDOMINAL PAIN: 1

## 2025-08-31 RX ORDER — FUROSEMIDE 20 MG/1
TABLET ORAL
Qty: 180 TABLET | OUTPATIENT
Start: 2025-08-31